# Patient Record
Sex: MALE | Race: OTHER | Employment: FULL TIME | URBAN - NONMETROPOLITAN AREA
[De-identification: names, ages, dates, MRNs, and addresses within clinical notes are randomized per-mention and may not be internally consistent; named-entity substitution may affect disease eponyms.]

---

## 2021-02-22 ENCOUNTER — HOSPITAL ENCOUNTER (OUTPATIENT)
Dept: PHYSICAL THERAPY | Age: 36
Setting detail: THERAPIES SERIES
Discharge: HOME | End: 2021-02-22
Payer: COMMERCIAL

## 2021-02-22 PROCEDURE — 97161 PT EVAL LOW COMPLEX 20 MIN: CPT

## 2021-02-22 NOTE — FLOWSHEET NOTE
** PLEASE SIGN, DATE AND TIME CERTIFICATION BELOW AND RETURN TO [facility] OUTPATIENT REHABILITATION (FAX #: [phone]). ATTEST/CO-SIGN IF ACCESSING VIA [employer]. THANK YOU.**    I certify that I have examined the patient below and determined that Physical Medicine and Rehabilitation service is necessary and that I approve the established plan of care for up to 90 days or as specifically noted. Attestation, signature or co-signature of physician indicates approval of certification requirements.    ________________________ ____________ __________  Physician Signature   Date   Time  [address] [facility]  PHYSICAL THERAPY  [x] EVALUATION  [] DAILY NOTE (LAND) [] DAILY NOTE (AQUATIC ) [] PROGRESS NOTE [] DISCHARGE NOTE    [x] [address] [facility]   [] [place] [address]    [] [facility] [facility]   [] Armand Leosffer    Date: 2021  Patient Name:  Endy Mcgill  : 1985  MRN: [id number]  CSN: [id number]    Referring Practitioner Lucia Adam PA-C   Diagnosis Sprain of medial collateral ligament of right knee, subsequent encounter [S83.411D]    Treatment Diagnosis S83.411D   Date of Evaluation 21    Additional Pertinent History Anxiety Disorder/PTSD      Functional Outcome Measure Used LEFS   Functional Outcome Score 43 (21)       Insurance: Primary: Payor: [address] [place] [address] Road /  /  / ,   Secondary:    Authorization Information: Approved for 2-3 times a week for 4 weeks, total of 12 visits from 21 to 21. An extension has been asked for. Aquatics is not covered. Modalities are covered. Telehealth is not covered. 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   He was standing in mud, when he went to move he picked up right foot and it slid in mud. He heard a pop and had pain when he was trying to push out of the mud. At lunch time, pain decreased. That afternoon he was unable to kneel on right knee. Went to ER that night ([facility]). He went to Occupational Health the next day. Had an MRI completed the end of January. MRI showing sprain of MCL and patellar tendonitis on right knee. Social/Functional History and Current Status:  Medications and Allergies have been reviewed and are listed on Medical History Questionnaire. Alonzo Caldwell lives with spouse in a multiple floor home with ability to complete ADL's on main floor with stairs and a handrail to enter. Task Previous Current   ADLs  Independent Independent   IADL's Independent Independent   Ambulation Independent Independent   Transfers Independent Independent   Recreation Independent Independent   Community Integration Independent Independent   Driving Active  Active    Work [employer]. Occupation: 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  Lifting, kneeling, awkward positions  [employer]. OBJECTIVE:    Pain: 5/10 right knee   Palpation Good patellar mobility bilateral knee   Edema Left knee: 51.5cm, Right: 49.5cm. Painful inferior right knee. Range of Motion Right knee: 0-121 degrees, Left knee: 0-125 degrees. Strength    Coordination No difficulties   Sensation No numbness or tingling   Bed Mobility Independent   Transfers Mod I   Ambulation Ambulation with no AD.   Slight decreased pace, decreased heel strike       Balance SLS 15 seconds bilateral             TREATMENT   Precautions: Anxiety Disorder/PTSD   Pain: 5/10 right knee    X in shaded column indicates activity completed today   Modalities Parameters/  Location  Notes                     Manual Therapy Time/Technique  Notes                     Exercise/Intervention   Notes Specific Interventions Next Treatment: Modalities as needed for pain, right knee stretches and strengthening    Activity/Treatment Tolerance:  [x]  Patient tolerated treatment well  []  Patient limited by fatigue  []  Patient limited by pain   []  Patient limited by medical complications  []  Other:     Assessment: Bar Garcia presents to therapy with right knee pain. He will benefit from therapy to assist with decreasing pain, increasing ROM and strength. Body Structures/Functions/Activity Limitations: impaired activity tolerance, impaired balance, impaired ROM, impaired sensation, impaired strength, pain and abnormal gait  Prognosis: good    GOALS:  Patient Goal: \"Get back to regular everyday activities without any pain. \"    Short Term Goals:  Time Frame: 4 weeks  1. Improve LEFS to 48 or greater to assist with duties at work. 2.  Increase right knee ROM to 0-125 to assist with bringing leg into car. 3.  Decrease right knee pain to 3/10 to assist with return to full duty at work. 4.  Increase right knee strength to 5/5 to assist with getting up from a lower car. Long Term Goals:  Time Frame: NA      Patient Education:    [x]  HEP/Education Completed: Plan of Care, Goals   [address] Access Code:  []  No new Education completed  []  Reviewed Prior HEP      []  Patient verbalized and/or demonstrated understanding of education provided. []  Patient unable to verbalize and/or demonstrate understanding of education provided. Will continue education. [x]  Barriers to learning: none    PLAN:  Treatment Recommendations: Strengthening, Range of Motion, Functional Mobility Training, Transfer Training, Endurance Training, Gait Training, Manual Therapy - Soft Tissue Mobilization, Pain Management, Home Exercise Program, Patient Education, Safety Education and Training, Positioning and Modalities    [x]  Plan of care initiated.   Plan to see patient 2-3 times per week for 4 weeks to address the treatment planned outlined above.   []  Continue with current plan of care  []  Modify plan of care as follows:    []  Hold pending physician visit  []  Discharge    Time In 1645   Time Out 1730   Timed Code Minutes: 0 min   Total Treatment Time: 45 min       Electronically Signed by: Antoinette Perez

## 2021-02-25 ENCOUNTER — HOSPITAL ENCOUNTER (OUTPATIENT)
Dept: PHYSICAL THERAPY | Age: 36
Setting detail: THERAPIES SERIES
Discharge: HOME | End: 2021-02-25
Payer: COMMERCIAL

## 2021-02-25 PROCEDURE — 97110 THERAPEUTIC EXERCISES: CPT

## 2021-02-25 NOTE — PROGRESS NOTES
[address] [facility]  PHYSICAL THERAPY  [] EVALUATION  [x] DAILY NOTE (LAND) [] DAILY NOTE (AQUATIC ) [] PROGRESS NOTE [] DISCHARGE NOTE    [x] OUTPATIENT REHABILITATION [facility]   [] [place] [address]    [] [facility]   [] Delmis Conway    Date: 2021  Patient Name:  Debbie Spencer  : 1985  MRN: [id number]  CSN: [id number]    Referring Practitioner Samantha Du PA-C   Diagnosis Sprain of medial collateral ligament of right knee, subsequent encounter [S83.411D]    Treatment Diagnosis S83.411D   Date of Evaluation 21    Additional Pertinent History Anxiety Disorder/PTSD      Functional Outcome Measure Used LEFS   Functional Outcome Score 43 (21)       Insurance: Primary: Payor: [address] [place] [address] /  /  / ,   Secondary:    Authorization Information: Approved for 2-3 times a week for 4 weeks, total of 12 visits from 21 to 21. An extension received to 21. 12 visits from 21 to 21. Aquatics is not covered. Modalities are covered. Telehealth is not covered. Visit # 2, 2/10 for progress note   Visits Allowed: 12   Recertification Date:    Physician Follow-Up: 21   Physician Orders: Bryan Arriaga and treat   History of Present Illness: Right knee pain since 2020     SUBJECTIVE: Patient reports that he has increased right knee stiffness after work. He denies pain today.      TREATMENT   Precautions: Anxiety Disorder/PTSD   Pain: Right knee stiffness at end of work day, denies pain    X in shaded column indicates activity completed today   Modalities Parameters/  Location  Notes                     Manual Therapy Time/Technique  Notes                     Exercise/Intervention   Notes   Sports Art bike  x5 minutes  Level 3  X Seat 5    Step stretches: knee flexion, knee extension, calf stretch  3 sets ea 20 sec X RLE          Supine:        Quad sets, SAQ,  10x 5 sec X RLE   Heel sides, hip abduction 10x  X RLE   Side lying: hip abduction, clamshells  10x  X RLE                 Standing:        Heel/toe raises, HS curls, 3 way hip kicks, marches, mini squats 10x B  X    Rockerboard: taps/balance 10x taps  20 sec balance front/back  X No UE with balance   NK table (knee flexion, extension) with ball squeeze  5-10x ea 2.5# X Had complaints of \"popping\" at right medial knee region but denied it being painful but limited reps today   TG squats 10x Level J X Cues to decrease range, and pain noting more tolerable     Specific Interventions Next Treatment: Modalities as needed for pain, right knee stretches and strengthening    Activity/Treatment Tolerance:  [x]  Patient tolerated treatment well  []  Patient limited by fatigue  []  Patient limited by pain   []  Patient limited by medical complications  []  Other:     Assessment: Initiated therapeutic exercises as documented above. Demonstration and cues provided on proper technique with added exercises to ensure maximal muscle activation. Patient tolerated treatment session well. He noted some \"popping\" at his right medial knee region while performing NK table exercises, so limited reps. GOALS:  Patient Goal: \"Get back to regular everyday activities without any pain. \"    Short Term Goals:  Time Frame: 4 weeks  1. Improve LEFS to 48 or greater to assist with duties at work. 2.  Increase right knee ROM to 0-125 to assist with bringing leg into car. 3.  Decrease right knee pain to 3/10 to assist with return to full duty at work. 4.  Increase right knee strength to 5/5 to assist with getting up from a lower car. Long Term Goals:  Time Frame: NA    educated   Patient Education:   [x]  HEP/Education Completed: Initiated therapeutic exercises.  [employer] Access Code:  []  No new Education completed  []  Reviewed Prior HEP      [x]  Patient verbalized and/or demonstrated understanding of education provided.   []  Patient unable to verbalize and/or demonstrate understanding of education provided. Will continue education. [x]  Barriers to learning: none    PLAN:  Treatment Recommendations: Strengthening, Range of Motion, Functional Mobility Training, Transfer Training, Endurance Training, Gait Training, Manual Therapy - Soft Tissue Mobilization, Pain Management, Home Exercise Program, Patient Education, Safety Education and Training, Positioning and Modalities    []  Plan of care initiated. Plan to see patient 2-3 times per week for 4 weeks to address the treatment planned outlined above.   [x]  Continue with current plan of care  []  Modify plan of care as follows:    []  Hold pending physician visit  []  Discharge    Time In 1546   Time Out 1625   Timed Code Minutes: 39 min   Total Treatment Time:  39 min       Electronically Signed by: Ame Brennan

## 2021-03-03 NOTE — DISCHARGE SUMMARY
[address] [facility]    Patient Name: Kishore Stanton        CSN: [id number]   YOB: 1985  Gender: male  Torie Kum[place], [place],    Sprain of medial collateral ligament of right knee, subsequent encounter [O25.783C] ,      Patient is discharged from Physical Therapy services at this time. See last note for details related to results of therapy and goal achievement. Reason for discharge: Minajaspreet Bentley attended evaluation and 1 treatment. He then no-showed for appointments on 3/1 and 3/2. He was called and reports he is back to full duty at work and to cancel his appointments. Please see previous notes for further details. Hussain Escobedo  [address], [address] [facility]

## 2021-03-26 ENCOUNTER — HOSPITAL ENCOUNTER (OUTPATIENT)
Dept: PHYSICAL THERAPY | Age: 36
Setting detail: THERAPIES SERIES
Discharge: HOME | End: 2021-03-26
Payer: COMMERCIAL

## 2022-01-18 ENCOUNTER — INITIAL CONSULT (OUTPATIENT)
Dept: PULMONOLOGY | Age: 37
End: 2022-01-18
Payer: OTHER GOVERNMENT

## 2022-01-18 VITALS
TEMPERATURE: 98.4 F | OXYGEN SATURATION: 98 % | DIASTOLIC BLOOD PRESSURE: 86 MMHG | BODY MASS INDEX: 41.3 KG/M2 | WEIGHT: 295 LBS | HEIGHT: 71 IN | SYSTOLIC BLOOD PRESSURE: 128 MMHG | HEART RATE: 76 BPM

## 2022-01-18 DIAGNOSIS — G47.10 HYPERSOMNIA: ICD-10-CM

## 2022-01-18 DIAGNOSIS — I10 BENIGN ESSENTIAL HTN: ICD-10-CM

## 2022-01-18 DIAGNOSIS — R06.89 SLEEP RELATED CHOKING SENSATION: ICD-10-CM

## 2022-01-18 DIAGNOSIS — E66.01 MORBID OBESITY WITH BMI OF 40.0-44.9, ADULT (HCC): ICD-10-CM

## 2022-01-18 DIAGNOSIS — R06.81 WITNESSED EPISODE OF APNEA: ICD-10-CM

## 2022-01-18 DIAGNOSIS — G47.33 OSA (OBSTRUCTIVE SLEEP APNEA): Primary | ICD-10-CM

## 2022-01-18 PROCEDURE — 99203 OFFICE O/P NEW LOW 30 MIN: CPT | Performed by: INTERNAL MEDICINE

## 2022-01-18 RX ORDER — MELATONIN 10 MG
10 CAPSULE ORAL NIGHTLY
COMMUNITY

## 2022-01-18 RX ORDER — MELOXICAM 15 MG/1
15 TABLET ORAL DAILY
COMMUNITY

## 2022-01-18 RX ORDER — AMLODIPINE BESYLATE 5 MG/1
5 TABLET ORAL DAILY
COMMUNITY

## 2022-01-18 NOTE — PATIENT INSTRUCTIONS
Patient Education        Learning About CPAP for Sleep Apnea  What is CPAP? CPAP is a small machine that you use at home every night while you sleep. It increases air pressure in your throat to keep your airway open. When you have sleep apnea, this can help you sleep better, feel better, and avoid future health problems. CPAP stands for \"continuous positive airway pressure. \"  The CPAP machine will have one of the following:  · A mask that covers your nose and mouth  · A mask that covers your nose only  · A nasal pillow that covers only the openings of your nose  Why is it done? CPAP is usually the best treatment for obstructive sleep apnea. It is the first treatment choice and the most widely used. CPAP:  · Helps you have more normal sleep, so you feel less sleepy and more alert during the daytime. · May help keep heart failure or other heart problems from getting worse. · May help lower your blood pressure. If you have a bed partner, they may also sleep better when you use a CPAP. That's because you aren't snoring or restless. Your doctor may suggest CPAP if you have:  · Moderate to severe sleep apnea. · Sleep apnea and coronary artery disease (CAD). · Sleep apnea and heart failure. What are the side effects? Some people who use CPAP have:  · A dry or stuffy nose and a sore throat. · Irritated skin on the face. · Bloating. How can you care for yourself? If using CPAP is not comfortable, or if you have certain side effects, work with your doctor to fix them. Here are some things you can try:  · Be sure the mask, nasal mask, or nasal pillow fits well. · See if your doctor can adjust the pressure of your CPAP. · If your nose or mouth is dry, set the machine to deliver warmer or wetter air. Or try using a humidifier. · If your nose is runny or stuffy, talk to your doctor about using a decongestant medicine or steroid nasal spray. Be safe with medicines.  Read and follow all instructions on the label. Do not use the medicine longer than the label says. · Your doctor may also help you with problems like swallowing air, bloating, or claustrophobia. Talk to your doctor if you're still having problems. If these things don't help, you might try a different type of machine. Where can you learn more? Go to https://chpeiwonaeweb.OpenTrust. org and sign in to your [employer] account. Enter E758 in the TellMi box to learn more about \"Learning About CPAP for Sleep Apnea. \"     If you do not have an account, please click on the \"Sign Up Now\" link. Current as of: July 6, 2021               Content Version: 13.1  © 2006-2021 Healthwise, R&L. Care instructions adapted under license by [facility]. If you have questions about a medical condition or this instruction, always ask your healthcare professional. [place] [address] any warranty or liability for your use of this information. Patient Education        Learning About Obesity  What is obesity? Obesity means having an unhealthy amount of body fat. This puts your health in danger. It can lead to other health problems, such as type 2 diabetes and high blood pressure. How do you know if your weight is in the obesity range? To know if your weight is in the obesity range, your doctor looks at your body mass index (BMI) and waist size. BMI is a number that is calculated from your weight and your height. To figure out your BMI for yourself, you can use an online tool, such as http://www.varghese.com/ on the [employer]. If your BMI is 30.0 or higher, it falls within the obesity range. Keep in mind that BMI and waist size are only guides. They are not tools to determine your ideal body weight. What causes obesity? When you take in more calories than you burn off, you gain weight.  How you eat, how active you are, and other things affect how your body uses calories and whether you gain weight. If you have family members who have too much body fat, you may have inherited a tendency to gain weight. And your family also helps form your eating and lifestyle habits, which can lead to obesity. Also, our busy lives make it harder to plan and cook healthy meals. For many of us, it's easier to reach for prepared foods, go out to eat, or go to the drive-through. But these foods are often high in saturated fat and calories. Portions are often too large. What can you do to reach a healthy weight? Focus on health, not diets. Diets are hard to stay on and don't work in the long run. It is very hard to stay with a diet that includes lots of big changes in your eating habits. Instead of a diet, focus on lifestyle changes that will improve your health and achieve the right balance of energy and calories. To lose weight, you need to burn more calories than you take in. You can do it by eating healthy foods in reasonable amounts and becoming more active, even a little bit every day. Making small changes over time can add up to a lot. Make a plan for change. Many people have found that naming their reasons for change and staying focused on their plan can make a big difference. Work with your doctor to create a plan that is right for you. · Ask yourself: Vazquez Baumgarten are my personal, most powerful reasons for wanting this change? What will my life look like when I've made the change? \"  · Set your long-term goal. Make it specific, such as \"I will lose x pounds. \"  · Break your long-term goal into smaller, short-term goals. Make these small steps specific and within your reach, things you know you can do. These steps are what keep you going from day to day. Talk with your doctor about other weight-loss options. If you have a BMI in a certain range and have not been able to lose weight with diet and exercise, medicine or surgery may be an option for you.  Before your doctor will prescribe medicines or surgery, he or she will probably want you to be more active and follow your healthy eating plan for a period of time. These habits are key lifelong changes for managing your weight, with or without other medical treatment. And these changes can help you avoid weight-related health problems. How can you stay on your plan for change? Be ready. Choose to start during a time when there are few events like holidays, social events, and high-stress periods. These events might trigger slip-ups. Decide on your first few steps. Most people have more success when they make small changes, one step at a time. For example, you might switch a daily candy bar to a piece of fruit, walk 10 minutes more, or add more vegetables to a meal.  Line up your support people. Make sure you're not going to be alone as you make this change. Connect with people who understand how important it is to you. Ask family members and friends for help in keeping with your plan. And think about who could make it harder for you, and how to handle them. Try tracking. People who keep track of what they eat, feel, and do are better at losing weight. Try writing down things like:  · What and how much you eat. · How you feel before and after each meal.  · Details about each meal (like eating out or at home, eating alone, or with friends or family). · What you do to be active. Look and plan. As you track, look for patterns that you may want to change. Take note of:  · When you eat and whether you skip meals. · How often you eat out. · How many fruits and vegetables you eat. · When you eat beyond feeling full. · When and why you eat for reasons other than being hungry. When you stray from your plan, don't get upset. Figure out what made you slip up and how you can fix it. Can you take medicines or have surgery to lose weight?   If you have a BMI in a certain range and have not been able to lose weight with diet and exercise, medicine or surgery may be an option for you. If you have a BMI of at least 30.0 (or a BMI of at least 27.0 and another health problem related to your weight), ask your doctor about weight-loss medicines. They work by making you feel less hungry, making you feel full more quickly, or changing how you digest fat. Medicines are used along with diet changes and more physical activity to help you make lasting changes. If you have a BMI of 40.0 or more (or a BMI of 35.0 or more and another health problem related to your weight), your doctor may talk with you about surgery. Weight-loss surgery has risks, and you will need to work with your doctor to compare the risk of having obesity with the risks of surgery. With any option you choose, you will still need to eat a healthy diet and get regular exercise. Follow-up care is a key part of your treatment and safety. Be sure to make and go to all appointments, and call your doctor if you are having problems. It's also a good idea to know your test results and keep a list of the medicines you take. Where can you learn more? Go to https://BandwidthpepicewGMI Ratings.Evryx Technologies. org and sign in to your Add[employer] account. Enter N111 in the [facility] box to learn more about \"Learning About Obesity. \"     If you do not have an account, please click on the \"Sign Up Now\" link. Current as of: March 17, 2021               Content Version: 13.1  © 4082-7217 Healthwise, Incorporated. Care instructions adapted under license by [facility] ([facility]). If you have questions about a medical condition or this instruction, always ask your healthcare professional. [place] [address] any warranty or liability for your use of this information. Patient Education        Learning About Low-Carbohydrate Foods  What foods are low in carbohydrate? The foods you eat contain nutrients, such as vitamins and minerals. Carbohydrate is a nutrient.  Your body needs the right amount to stay healthy and work as it should. You can use the list below to help you make choices about which foods to eat. Some foods that are lower in carbohydrate include:  Dairy and dairy alternatives  · Cheese  · Cottage cheese  · Cream cheese  · Nut milk (unsweetened)  · Soy milk (unsweetened)  · Yogurt (Greek, plain)  Fruits  · Avocado  · [employer] and other protein foods  · Almonds  · Beef  · Chicken  · Cod  · Eggs  · Halibut  · Peanut butter and other nut butters  · Pistachios  · Pork  · Pumpkin seeds  · Tofu  · Trout  · [place]  · [place] ([facility])  · Walnuts  Vegetables  · Broccoli  · Carrots  · Cauliflower  · Green beans  · Mushrooms  · Peppers  · Salad greens  · Spinach  · Tomatoes  Work with your doctor to find out how much of this nutrient you need. Depending on your health, you may need more or less of it in your diet. Where can you learn more? Go to https://Boomsense.e Health Access. org and sign in to your [employer] account. Enter [id number] in the [employer] box to learn more about \"Learning About Low-Carbohydrate Foods. \"     If you do not have an account, please click on the \"Sign Up Now\" link. Current as of: September 8, 2021               Content Version: 13.1  © 9915-2338 Healthwise, Incorporated. Care instructions adapted under license by [facility] ([facility]). If you have questions about a medical condition or this instruction, always ask your healthcare professional. [place] [address] any warranty or liability for your use of this information. Patient Education        Learning About Low-Carbohydrate Diets  What is a low-carbohydrate diet? A low-carbohydrate (or \"low-carb\") diet limits foods and drinks that have carbohydrates. This includes grains, fruits, milk and yogurt, and starchy vegetables like potatoes, beans, and corn. It also avoids foods and drinks that have added sugar. Instead, low-carb diets include foods that are high in protein and fat.   Why might you follow a low-carb diet? Low-carb diets may be used for a variety of reasons, such as for weight loss. People who have diabetes may use a low-carb diet to help manage their blood sugar levels. What should you do before you start the diet? Talk to your doctor before you try any diet. This is even more important if you have health problems like kidney disease, heart disease, or diabetes. Your doctor may suggest that you meet with a registered dietitian. A dietitian can help you make an eating plan that works for you. What foods do you eat on a low-carb diet? On a low-carb diet, you choose foods that are high in protein and fat. Examples of these are:  · Meat, poultry, and fish. · Eggs. · Nuts, such as walnuts, pecans, almonds, and peanuts. · Peanut butter and other nut butters. · Tofu. · Avocado. · Reatha Hazy. · Non-starchy vegetables like broccoli, cauliflower, green beans, mushrooms, peppers, lettuce, and spinach. · Unsweetened non-dairy milks like almond milk and coconut milk. · Cheese, cottage cheese, and cream cheese. Current as of: September 8, 2021               Content Version: 13.1  © 2006-2021 Healthwise, Incorporated. Care instructions adapted under license by [facility] ([facility]). If you have questions about a medical condition or this instruction, always ask your healthcare professional. [place] [address] any warranty or liability for your use of this information.

## 2022-01-18 NOTE — PROGRESS NOTES
New Sleep Patient H/P    Presentation:  Last Watters is referred by [place] for RUBÉN    Last Watters is a text book case of RUBÉN, was investigated at the [place] via HST revealing severe RUBÉN with AHI of 55/h. Marlon, snores very loudly, stops breathing at night according to his wife, wakes up choking and regurgitating food, wakes up frequently, sleep is non restorative, sleepy during the day. Has gained a lot of wt (80 lbs), BMI 41. Time in Bed:   Bedtime: 10 p.m. Awakens  6 a.m. Different on weekends? No      Marlon falls asleep in 10  minutes. Any awakenings? Yes  Difficulty Falling back to sleep? No  {Symptoms began:  a few years ago. Symptoms include: snoring, choking, gasping, periods of not breathing, excessive daytime sleepiness, disrupted sleep, naps    Previous evaluation and treatment? Yes    Which ones? H[facility]    He denies any history of sleep walking or sleep talking. No history of seizures activity. No history suggestive of restless legs syndrome. No history of bruxism. No history of head injury. Naps:  Any naps? Yes and are they helpful Yes    Snoring and Apneas:  Do you snore or been told you a snore? Yes  How long have known about your snoring? years  Any witnessed apneas? Yes  Any awakenings with choking or gasping? Yes    Dreams:  Any recurring dreams? No  Hallucinations? No  Sleep Paralysis? No  Symptoms of Cataplexy? No    Driving History:  Do you have a CDL or drive long distances for work? No  Any driving accidents in the past year? No  Any sleepiness while driving? Yes    Weight:  Any change in weight over the past year? Yes   How about past 5 years? Yes  How much? 80      No past medical history on file.     Past Surgical History:   Procedure Laterality Date    APPENDECTOMY      ROTATOR CUFF REPAIR      VASECTOMY      WISDOM TOOTH EXTRACTION         Social History     Tobacco Use    Smoking status: Never Smoker    Smokeless tobacco: Not on file   Substance Use Topics    Alcohol use: No    Drug use: No       Allergies   Allergen Reactions    Morphine        Current Outpatient Medications   Medication Sig Dispense Refill    famotidine (PEPCID) 20 MG tablet Take 1 tablet by mouth 2 times daily as needed 60 tablet 0    naproxen (NAPROSYN) 500 MG tablet Take 1 tablet by mouth 2 times daily. 60 tablet 0     No current facility-administered medications for this visit. No family history on file. Any family history of any sleep problems or any one in your family on CPAP? No    Social History     Tobacco Use    Smoking status: Never Smoker    Smokeless tobacco: Not on file   Substance Use Topics    Alcohol use: No    Drug use: No     Caffeine Intake: How much soda (pop), coffee, tea, power drinks do you ingest per day? 0 per day. Employment History:  Where do you work? [employer]  What are your shifts? 7a to 3p      Review of Systems:   General/Constitutional: No recent loss of weight or appetite changes. No fever or chills. HENT: Negative. Eyes: Negative. Upper respiratory tract: No nasal stuffiness or post nasal drip. Lower respiratory tract/ lungs: No cough or sputum production. No hemoptysis. Cardiovascular: No palpitations or chest pain. Gastrointestinal: No nausea or vomiting. Neurological: No focal neurologiacal weakness. Extremities: No edema. Musculoskeletal: No complaints. Genitourinary: No complaints. Hematological: Negative. Psychiatric/Behavioral: Negative. Skin: No itching.   Physical Exam:    HEIGHTHeight: 5' 11\" (180.3 cm) WEIGHTWeight: 295 lb (133.8 kg)    BMI:  41   Neck Size: 19.25     SAQLI:26   ESS: 21   Vitals:   Vitals:    01/18/22 1328   BP: 128/86   Site: Left Lower Arm   Position: Sitting   Cuff Size: Medium Adult   Pulse: 76   Temp: 98.4 °F (36.9 °C)   TempSrc: Temporal   SpO2: 98%  Comment: r/a   Weight: 295 lb (133.8 kg)   Height: 5' 11\" (1.803 m)          Mallampati Score: 4    Physical Exam :  Constitutional: BMI 41  HENT:   Head: Normocephalic and atraumatic. Mouth/Throat: Large tongue, crowded pharynx, mallampati class 3   Eyes: Conjunctivae are normal. PERRLA. No scleral icterus. Neck: Neck supple. No JVD present. No tracheal deviation present. 19.2 IN circumference  Cardiovascular: Normal rate, regular rhythm, normal heart sounds. No murmur heard. Pulmonary/Chest: Effort normal and breath sounds normal. No stridor. No respiratory distress. No wheezes. No rales. Abdominal: Soft. No distension. No tenderness. Musculoskeletal: Normal range of motion. Lymphadenopathy:  No cervical adenopathy. Neurological: Alert and oriented to person, place, and time. No focal deficits. Skin: Skin is warm and dry. Patient is not diaphoretic. Psychiatric: Normal behavior with normal mood and affect. Diagnostic Data:    PAP Download:   Original or initial  AHI: 55.1     Date of initial study: 11/4/21         Assessment    Diagnosis Orders   1. RUBÉN (obstructive sleep apnea)  DME Order for CPAP as OP   2. Hypersomnia  DME Order for CPAP as OP   3. Morbid obesity with BMI of 40.0-44.9, adult ([facility] [address]  DME Order for CPAP as OP   4. Sleep related choking sensation  DME Order for CPAP as OP   5. Witnessed episode of apnea  DME Order for CPAP as OP       Plan     Orders Placed This Encounter   Procedures    DME Order for CPAP as OP     CPAP Auto Adjust 10 - 20 cm H2O    Heated Humidifier    Heated Tubing with Integrated Element 1 per 3 months    Full Face Mask 1 per 3 months and Cushions/Seals 1 per month    Headgear 1 per 6 months, Chin Strap 1 per 6 months, Disposable Filters 2 per month, Non-disposable Filters 1 per 6 months, Chambers 1 per 6 months and Other Related Supplies. Replace supplies and accessories as needed. Patient may choose another interface for compliance and comfort.   Comments: Needs mask fitting, long facial hair  Diagnosis: G47.33  Length of need: 12 Months          Mask Desensitization and Pre study teaching? No  Weight Loss Information Given? Yes  Sleep Hygiene Discussed? Yes    -He was advised to call [facility] regarding supplies if needed.  -He call my office for earlier appointment if needed for worsening of sleep symptoms.  -Sherie  educated about my impression and plan. Patient verbalizes understanding.

## 2022-06-27 ENCOUNTER — OFFICE VISIT (OUTPATIENT)
Dept: NEUROSURGERY | Age: 37
End: 2022-06-27
Payer: OTHER GOVERNMENT

## 2022-06-27 ENCOUNTER — HOSPITAL ENCOUNTER (OUTPATIENT)
Dept: MRI IMAGING | Age: 37
Discharge: HOME | End: 2022-06-27

## 2022-06-27 VITALS
DIASTOLIC BLOOD PRESSURE: 90 MMHG | WEIGHT: 315 LBS | HEIGHT: 71 IN | BODY MASS INDEX: 44.1 KG/M2 | HEART RATE: 81 BPM | SYSTOLIC BLOOD PRESSURE: 146 MMHG

## 2022-06-27 DIAGNOSIS — Z00.6 EXAMINATION FOR NORMAL COMPARISON FOR CLINICAL RESEARCH: ICD-10-CM

## 2022-06-27 DIAGNOSIS — M54.16 LUMBAR RADICULOPATHY: Primary | ICD-10-CM

## 2022-06-27 PROCEDURE — 99203 OFFICE O/P NEW LOW 30 MIN: CPT

## 2022-06-27 RX ORDER — HYDROCHLOROTHIAZIDE 25 MG/1
25 TABLET ORAL DAILY
COMMUNITY
Start: 2022-06-15

## 2022-06-27 ASSESSMENT — ENCOUNTER SYMPTOMS
BACK PAIN: 1
COLOR CHANGE: 0
TROUBLE SWALLOWING: 0
CONSTIPATION: 0
COUGH: 0
SHORTNESS OF BREATH: 0
NAUSEA: 0

## 2022-06-27 NOTE — PROGRESS NOTES
[facility] PROFESSIONAL SERVS  [address] [place] [address]  Dept: [phone]  Dept Fax: [phone]      Patient Name:  Luther Babb  Visit Date:  6/27/2022    HPI:     Mr. Allison Molina is a 39 y.o. male that presents today at [facility] Neurosurgery for evaluation of the following:      Chief Complaint   Patient presents with    Consultation     Low back pain        HPI   Chata Leyva is a 39year old who presents to the office alone ambulating without assistive device. 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 He stated that his pain has progressively gotten worse. 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 He stated his pain is constant. He stated his pain at its worst is a 10 and on average a 8. He stated the pain radiates into his bilateral buttocks and into the posterior thigh bilaterally and and into the right lateral and anterior right thigh. The pain also radiates down his right leg into his right shin. He describes his pain as a sharp,  shooting, and burning sensation. He stated bending, twisting, and lifting makes his pain worse. Standing or sitting for long period of time increases his pain as well. He stated that laying on the floor or sitting on the floor provides him relief. He stated he has numbness and tingling to his right lateral and anterior thigh. He stated heat helps his pain. He stated ice does no help. He stated he had therapy with chiropractor care, acupuncture, and gentle stretches for 12 weeks. He stated that this did not provide him any relief. He stated he seen pain management back in 2009. He stated this was while he was in [place] at [facility]. He stated  he had Cortizone shots times 4. He has never had nerve burning. Patient stated he had a MRI of his lumbar spine and it showed he had a herniated disc. Will obtain report and images from the [facility]. He stated occasionally he has weakness in his right leg. He stated that he is experiencing muscle spasms. He denies bowel or bladder incontinece. He denies saddle anesthesia. He stated the pain is decreasing his activity and making him more fatigued. He stated the pain is waking him at night. He stated the Voltaren gel does not help. He stated that the mobic does take the edge off. He stated that he has taken Flexeril in the past but it caused drowsiness. He denies recent trauma. Patient denies previous cervical, thoracic, or lumbar surgery. Patient denies cevical, thoracic, and lumbar surgery. Medications:    Current Outpatient Medications:     hydroCHLOROthiazide (HYDRODIURIL) 25 MG tablet, Take 25 mg by mouth daily, Disp: , Rfl:     diclofenac sodium (VOLTAREN) 1 % GEL, Apply topically 3 times daily, Disp: , Rfl:     sertraline (ZOLOFT) 50 MG tablet, Take 50 mg by mouth daily, Disp: , Rfl:     meloxicam (MOBIC) 15 MG tablet, Take 15 mg by mouth daily, Disp: , Rfl:     amLODIPine (NORVASC) 5 MG tablet, Take 5 mg by mouth daily, Disp: , Rfl:     melatonin 10 MG CAPS capsule, Take 10 mg by mouth nightly, Disp: , Rfl:     naproxen (NAPROSYN) 500 MG tablet, Take 1 tablet by mouth 2 times daily. , Disp: 60 tablet, Rfl: 0    The patient is allergic to morphine and wound dressings. Past Medical History  Jeremy Robbins  has a past medical history of Anxiety, Hypertension, Low back pain, and Sleep apnea. Past Surgical History  The patient  has a past surgical history that includes Appendectomy; Vasectomy; [place] tooth extraction; and Rotator cuff repair. Family History  This patient's family history includes No Known Problems in his mother. Social History  Jeremy Robbins  reports that he quit smoking about 13 years ago. He started smoking about 19 years ago. He has a 6.00 pack-year smoking history. He has never used smokeless tobacco. He reports previous alcohol use.  He reports that he does not use drugs.    Subjective:      Review of Systems   Constitutional: Positive for activity change and fatigue. Negative for appetite change. HENT: Negative for trouble swallowing. Eyes: Negative for visual disturbance. Respiratory: Negative for cough and shortness of breath. Cardiovascular: Negative for chest pain. Gastrointestinal: Negative for constipation and nausea. Genitourinary: Negative for difficulty urinating. Musculoskeletal: Positive for back pain, gait problem and myalgias. Skin: Negative for color change, rash and wound. Neurological: Positive for numbness. Negative for dizziness and weakness. Psychiatric/Behavioral: Positive for sleep disturbance. Negative for confusion. The patient is not nervous/anxious. Objective:     BP (!) 146/90 (Site: Right Upper Arm, Position: Sitting, Cuff Size: Large Adult)   Pulse 81   Ht 5' 10.98\" (1.803 m)   Wt (!) 315 lb 12.8 oz (143.2 kg)   BMI 44.07 kg/m²        Physical Exam  Musculoskeletal:         General: Tenderness present. Lumbar back: Spasms and tenderness present. Positive right straight leg raise test. Negative left straight leg raise test.        Back:       Comments: Positive vilma's on the right         Reviewed MRI Type:Lumbar spine  Report    Lab Results   Component Value Date    WBC 6.0 08/20/2015    HGB 14.4 08/20/2015    HCT 42.5 08/20/2015     08/20/2015    ALT 62 11/27/2011    AST 34 11/27/2011     08/20/2015    K 3.7 08/20/2015     08/20/2015    CREATININE 1.0 08/20/2015    BUN 15 08/20/2015    CO2 21 (L) 08/20/2015       Assessment and Plan      Diagnosis Orders   1.  Lumbar radiculopathy         -New patient referral for lumbar pain   -MRI report reviewed with patient   -Will have office staff obtain MRI of the lumbar spine  image and report from the [facility]   -CT of the lumbar spine without contrast to rule out bony abnormalities that could be leading to symptom presenation  -Referral to pain management   - Discussed with patient weight loss and healthy food options- patient has seen a dietician previously   - Continue gentle stretches at home at  Least 3 times a week. - If you experience sudden weakness, bowel, or bladder  incontinence report to the emergency room to be evaluated  -Follow up in 4 weeks  -All patient questions answered. Pt voiced understanding.  Patient instructed to call the office with any questions or concerns    Electronically signed by LUIS ANTONIO Roldan CNP on 6/27/2022 at 3:32 PM

## 2022-07-08 ENCOUNTER — HOSPITAL ENCOUNTER (OUTPATIENT)
Dept: CT IMAGING | Age: 37
Discharge: HOME | End: 2022-07-08
Payer: OTHER GOVERNMENT

## 2022-07-08 DIAGNOSIS — M54.16 LUMBAR RADICULOPATHY: ICD-10-CM

## 2022-07-08 PROCEDURE — 72131 CT LUMBAR SPINE W/O DYE: CPT

## 2022-07-29 ENCOUNTER — TELEPHONE (OUTPATIENT)
Dept: NEUROSURGERY | Age: 37
End: 2022-07-29

## 2022-07-29 NOTE — TELEPHONE ENCOUNTER
Called spoke to patient, he advised that he was seeing pain management at the [address] [place] [address]. I verbalized understanding and if he could obtain the visit notes.

## 2022-07-29 NOTE — TELEPHONE ENCOUNTER
Patient is returning call from the office about possibly needing to r/s his appt on 08/01/2022. The message on the appt said he was needing to see pain management prior to seeing neurosurgery. Patient is stating that he has been seeing pain management through the [address] will not cover him to be seen at the pain management office here d/t this. Patient stated he will contact the [place] about this as well.   Please advise

## 2022-10-17 ENCOUNTER — OFFICE VISIT (OUTPATIENT)
Dept: PHYSICAL MEDICINE AND REHAB | Age: 37
End: 2022-10-17
Payer: OTHER GOVERNMENT

## 2022-10-17 VITALS
BODY MASS INDEX: 44.1 KG/M2 | SYSTOLIC BLOOD PRESSURE: 160 MMHG | DIASTOLIC BLOOD PRESSURE: 90 MMHG | HEIGHT: 71 IN | WEIGHT: 315 LBS

## 2022-10-17 DIAGNOSIS — G89.29 OTHER CHRONIC PAIN: ICD-10-CM

## 2022-10-17 DIAGNOSIS — M51.36 DEGENERATIVE DISC DISEASE, LUMBAR: ICD-10-CM

## 2022-10-17 DIAGNOSIS — M79.18 MYOFASCIAL PAIN: ICD-10-CM

## 2022-10-17 DIAGNOSIS — M47.816 LUMBAR SPONDYLOSIS: Primary | ICD-10-CM

## 2022-10-17 PROCEDURE — 99204 OFFICE O/P NEW MOD 45 MIN: CPT | Performed by: ANESTHESIOLOGY

## 2022-10-17 NOTE — PROGRESS NOTES
Chronic Pain/PM&R Clinic Note     Encounter Date: 10/17/22    Subjective:   Chief Complaint:   Chief Complaint   Patient presents with    New Patient     Lower back. History of Present Illness:   Stephen Fuller is a 39 y.o. male seen in the clinic initially on 10/17/22 upon request from LUIS ANTONIO Eng for his history of chronic low back pain. He has been dealing with low back pain since 2004 since he was in the [facility]. He states that he sustained an injury while in the [employer]. His pain has progressively gotten worse over the last 20 years. He states his pain is in the small of his low back at his waistline. He rates this as a constant 6/10 aching, stabbing pain. His pain is made worse with any activities where he is on his feet especially with any twisting turning or bending. His pain is improved with rest and medications. 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 He denies any pain radiating further down his leg, associated leg numbness/tingling, focal leg weakness, saddle anesthesia, bowel/bladder incontinence episodes. He has tried physical therapy in the past, medical acupuncture and feels like these have not been very effective for him in the past.    History of Interventions:   Surgery: No previous lumbar surgeries  Injections: None    Current Treatment Medications:   Meloxicam 15 mg daily    Historical Treatment Medications:   None    Imaging/Studies:  CT L-spine (7/8/22)    PROCEDURE: CT LUMBAR SPINE WO CONTRAST       CLINICAL INFORMATION: Lumbar radiculopathy. Back pain. COMPARISON: No prior study. TECHNIQUE: 3 mm noncontrast axial images were obtained of the lumbar spine. Sagittal and coronal reconstructions were created. Angled images were reconstructed through the L3-4, L4-5 and L5-S1 disc levels.        All CT scans at this facility use dose modulation, iterative reconstruction, and/or weight-based dosing when appropriate to reduce radiation dose to as low as reasonably achievable. FINDINGS:   There is minimal, grade 1, retrolisthesis of L2 relative to L3, L3 relative to L4 and L4 relative to L5 on the basis of degenerative change. There is otherwise anatomic vertebral body height and alignment. No fracture of the lumbar vertebral column is    evident. Paraspinal soft tissues are unremarkable. At T12-L1 there is no significant spinal canal or neuroforaminal stenosis. At L1-2 there is no significant spinal canal or neuroforaminal stenosis. At L2-3 there is a minimal disc bulge without significant spinal canal stenosis and mild bilateral neural foraminal stenosis in association with mild ligament flavum thickening. At L3-4 there is partial uncovering the disc with superimposed shallow disc bulge which causes mild spinal canal stenosis in association with ligamentum flavum thickening. There is mild to moderate bilateral neural foraminal stenosis. At L4-5 there is minimal partial tearing the disc with superimposed shallow disc bulge which causes mild spinal canal stenosis in association with ligamentum flavum thickening. There is mild to moderate bilateral foraminal stenosis. At L5-S1 there is no significant spinal canal or neuroforaminal stenosis. Impression   Overall mild degenerative changes of the lumbar spine with areas of mild spinal canal stenosis and up to mild to moderate neural foraminal stenosis.        Past Medical History:   Diagnosis Date    Anxiety     Hypertension     Low back pain     Sleep apnea        Past Surgical History:   Procedure Laterality Date    APPENDECTOMY      ROTATOR CUFF REPAIR      VASECTOMY      WISDOM TOOTH EXTRACTION         Family History   Problem Relation Age of Onset    No Known Problems Mother          Medications & Allergies:   Current Outpatient Medications   Medication Instructions    amLODIPine (NORVASC) 5 mg, Oral, DAILY diclofenac sodium (VOLTAREN) 1 % GEL Topical, 3 TIMES DAILY    hydroCHLOROthiazide (HYDRODIURIL) 25 mg, Oral, DAILY    melatonin 10 mg, Oral, NIGHTLY    meloxicam (MOBIC) 15 mg, Oral, DAILY    naproxen (NAPROSYN) 500 mg, Oral, 2 TIMES DAILY    sertraline (ZOLOFT) 50 mg, Oral, DAILY       Allergies   Allergen Reactions    Morphine     Wound Dressings Itching, Rash and Swelling       Review of Systems:   Constitutional: negative for weight changes or fevers  Genitourinary: negative for bowel/bladder incontinence   Musculoskeletal: positive for low back pain  Neurological: negative for any leg weakness or numbness/tingling  Behavioral/Psych: negative for anxiety/depression   All other systems reviewed and are negative    Objective:     Vitals:    10/17/22 1512   BP: (!) 160/90     Constitutional: Pleasant, no acute distress   Head: Normocephalic, atraumatic   Eyes: Conjunctivae normal   Neck: Supple, symmetrical   Lungs: Normal respiratory effort, non-labored breathing   Cardiovascular: Limbs warm and well perfused   Abdomen: Non-protruded   Musculoskeletal: Muscle bulk symmetric, no atrophy, no gross deformities   · Lumbar Spine: ROM reduced in extension. Lumbar paraspinals tender to palpation bilaterally. SLR neg bilaterally. MARTY neg bilaterally. Positive facet loading bilaterally. Bilateral SI joints non-tender to palpation. Neurological: Cranial nerves II-XII grossly intact. · Gait - Slightly antalgic gait. Ambulates without assistive device. · Motor: 5/5 muscle strength in bilateral hip flexion, knee flexion, knee extension, ankle dorsiflexion, and ankle plantar flexion   · Sensory: LT sensation intact in lower limbs   · Reflexes: 2+ symmetrical in bilateral achilles, 2+ bilateral patellar, negative ankle clonus, downgoing babinski   Skin: No rashes or lesions present   Psychological: Cooperative, no exaggerated pain behaviors       Assessment:    Diagnosis Orders   1.  Lumbar spondylosis  CHG FLUOR NEEDLE/CATH SPINE/PARASPINAL DX/THER ADDON    [place] INJ DX/THER AGNT PARAVERT FACET JOINT, LUMBAR/SAC, 1ST LEVEL    [place] INJ DX/THER AGNT PARAVERT FACET JOINT, LUMBAR/SAC, 2ND LEVEL      2. Degenerative disc disease, lumbar        3. Other chronic pain        4. Myofascial pain            Amos Luong is a 39 y.o.male presenting to the pain clinic for evaluation of chronic low back pain. His clinical history and physical examination are consistent with lumbar facet mediated pain secondary to lumbar spondylosis and degenerative disc disease. I set him up for diagnostic lumbar medial branch blocks targeting bilateral L4/L5 and L5/S1 facet joints. Plan: The following treatment recommendations and plan were discussed in detail with Aoms Luong. Imaging/Studies/Labs:   I have reviewed patients imaging of CT L-spine and results were discussed with patient today. Analgesics:   Patient is taking meloxicam. Patient is advised to take as prescribed and not take on an empty stomach. Adjuvants:   None; patient wants to proceed with injection therapy only. Interventions: In presence of lumbar axial back pain and with physical exam consistent for facetal pain, the option of medial branch blocks at bilateral L4/L5 and L5/S1  was chosen. The risks and benefits were discussed in detail with the patient. Patient wants to proceed with the injection. Anticoagulation/NPO Recommendations:   Patient will need to hold meloxicam x 4 days prior to the procedure. Patient will need to be NPO x 8 hours prior to the procedure. Plan to start an IV prior to the procedure. Multidisciplinary Pain Management:   In the presence of complex, chronic, and multi-factorial pain, the importance of a multidisciplinary approach to pain management in the patients management regimen was emphasized and discussed in great detail.    PHYSICAL THERAPY: Patient is advised to see a physical therapist for gentle stretching exercises

## 2022-11-09 ENCOUNTER — PREP FOR PROCEDURE (OUTPATIENT)
Dept: PHYSICAL MEDICINE AND REHAB | Age: 37
End: 2022-11-09

## 2022-11-14 NOTE — H&P
Today, patient presents for planned medial branch blocks at bilateral L4/L5 and L5/S1    This note is reflective of the patient's previous visit for evaluation. We will proceed with today's planned procedure. Since patient's last visit for evaluation, there have been no interval changes in medical history. Patient has no new numbness, weakness, or focal neurological deficit since evaluation. Patient has no contraindications to injection (no anticoagulation or recent antibiotic intake for active infections), and has a  present or is able to drive themselves (as discussed and cleared by physician). Allergies to latex, contrast dye, and steroid medications have been confirmed with the patient prior to the procedure. NPO necessity has been assessed and accepted based on procedure complexity. The risks and benefits of the procedure have been explained including but are not limited to infection, bleeding, paralysis, immediate post procedure weakness, and dizziness; the patient acknowledges understanding and desires to proceed with the procedure. Patient has signed consent for same procedure as discussed in previous clinic encounter. All other questions and concerns were addressed at bedside. See procedure note for full details. Post procedure Instructions: The patient was advised not to drive during the day of the procedure and not to engage in any significant decision making (unless otherwise states by physician). The patient was also advised to be cautious with walking/activity for 24 hours following today's visit and asked not to engage in over-exertion (unless otherwise states by physician). After this time, it is ok to resume pre-procedure level of activity. Patient advised to apply ice to site of injection in situations of pain and discomfort. Patient advised to not submerge site of injection during bath or pool activities for approximately 24 hours post-procedure.  Patient attested to understanding post procedure directions / restrictions. All other questions and concerns addressed before patient discharge in ambulatory fashion. Chronic Pain/PM&R Clinic Note     Encounter Date: 10/17/22    Subjective:   Chief Complaint:   No chief complaint on file. History of Present Illness:   Dimple Hyman is a 39 y.o. male seen in the clinic initially on 11/14/22 upon request from LUIS ANTONIO Mitchell for his history of chronic low back pain. He has been dealing with low back pain since 2004 since he was in the [facility]. He states that he sustained an injury while in the [employer]. His pain has progressively gotten worse over the last 20 years. He states his pain is in the small of his low back at his waistline. He rates this as a constant 6/10 aching, stabbing pain. His pain is made worse with any activities where he is on his feet especially with any twisting turning or bending. His pain is improved with rest and medications. 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 He denies any pain radiating further down his leg, associated leg numbness/tingling, focal leg weakness, saddle anesthesia, bowel/bladder incontinence episodes. He has tried physical therapy in the past, medical acupuncture and feels like these have not been very effective for him in the past.    History of Interventions:   Surgery: No previous lumbar surgeries  Injections: None    Current Treatment Medications:   Meloxicam 15 mg daily    Historical Treatment Medications:   None    Imaging/Studies:  CT L-spine (7/8/22)    PROCEDURE: CT LUMBAR SPINE WO CONTRAST       CLINICAL INFORMATION: Lumbar radiculopathy. Back pain. COMPARISON: No prior study. TECHNIQUE: 3 mm noncontrast axial images were obtained of the lumbar spine. Sagittal and coronal reconstructions were created. Angled images were reconstructed through the L3-4, L4-5 and L5-S1 disc levels. All CT scans at this facility use dose modulation, iterative reconstruction, and/or weight-based dosing when appropriate to reduce radiation dose to as low as reasonably achievable. FINDINGS:   There is minimal, grade 1, retrolisthesis of L2 relative to L3, L3 relative to L4 and L4 relative to L5 on the basis of degenerative change. There is otherwise anatomic vertebral body height and alignment. No fracture of the lumbar vertebral column is    evident. Paraspinal soft tissues are unremarkable. At T12-L1 there is no significant spinal canal or neuroforaminal stenosis. At L1-2 there is no significant spinal canal or neuroforaminal stenosis. At L2-3 there is a minimal disc bulge without significant spinal canal stenosis and mild bilateral neural foraminal stenosis in association with mild ligament flavum thickening. At L3-4 there is partial uncovering the disc with superimposed shallow disc bulge which causes mild spinal canal stenosis in association with ligamentum flavum thickening. There is mild to moderate bilateral neural foraminal stenosis. At L4-5 there is minimal partial tearing the disc with superimposed shallow disc bulge which causes mild spinal canal stenosis in association with ligamentum flavum thickening. There is mild to moderate bilateral foraminal stenosis. At L5-S1 there is no significant spinal canal or neuroforaminal stenosis. Impression   Overall mild degenerative changes of the lumbar spine with areas of mild spinal canal stenosis and up to mild to moderate neural foraminal stenosis.        Past Medical History:   Diagnosis Date    Anxiety     Hypertension     Low back pain     Sleep apnea        Past Surgical History:   Procedure Laterality Date    APPENDECTOMY      ROTATOR CUFF REPAIR      VASECTOMY      WISDOM TOOTH EXTRACTION         Family History   Problem Relation Age of Onset    No Known Problems Mother          Medications & Allergies:   Current Outpatient Medications   Medication Instructions    amLODIPine (NORVASC) 5 mg, Oral, DAILY    diclofenac sodium (VOLTAREN) 1 % GEL Topical, 3 TIMES DAILY    hydroCHLOROthiazide (HYDRODIURIL) 25 mg, Oral, DAILY    melatonin 10 mg, Oral, NIGHTLY    meloxicam (MOBIC) 15 mg, Oral, DAILY    naproxen (NAPROSYN) 500 mg, Oral, 2 TIMES DAILY    sertraline (ZOLOFT) 50 mg, Oral, DAILY       Allergies   Allergen Reactions    Morphine     Wound Dressings Itching, Rash and Swelling       Review of Systems:   Constitutional: negative for weight changes or fevers  Genitourinary: negative for bowel/bladder incontinence   Musculoskeletal: positive for low back pain  Neurological: negative for any leg weakness or numbness/tingling  Behavioral/Psych: negative for anxiety/depression   All other systems reviewed and are negative    Objective: There were no vitals filed for this visit. Constitutional: Pleasant, no acute distress   Head: Normocephalic, atraumatic   Eyes: Conjunctivae normal   Neck: Supple, symmetrical   Lungs: Normal respiratory effort, non-labored breathing   Cardiovascular: Limbs warm and well perfused   Abdomen: Non-protruded   Musculoskeletal: Muscle bulk symmetric, no atrophy, no gross deformities   · Lumbar Spine: ROM reduced in extension. Lumbar paraspinals tender to palpation bilaterally. SLR neg bilaterally. MARTY neg bilaterally. Positive facet loading bilaterally. Bilateral SI joints non-tender to palpation. Neurological: Cranial nerves II-XII grossly intact. · Gait - Slightly antalgic gait. Ambulates without assistive device.    · Motor: 5/5 muscle strength in bilateral hip flexion, knee flexion, knee extension, ankle dorsiflexion, and ankle plantar flexion   · Sensory: LT sensation intact in lower limbs   · Reflexes: 2+ symmetrical in bilateral achilles, 2+ bilateral patellar, negative ankle clonus, downgoing babinski   Skin: No rashes or lesions present   Psychological: Cooperative, no exaggerated pain behaviors       Assessment:    Diagnosis Orders   1. Lumbar spondylosis  CHG FLUOR NEEDLE/CATH SPINE/PARASPINAL DX/THER ADDON    KS INJ DX/THER AGNT PARAVERT FACET JOINT, LUMBAR/SAC, 1ST LEVEL    KS INJ DX/THER AGNT PARAVERT FACET JOINT, LUMBAR/SAC, 2ND LEVEL      2. Degenerative disc disease, lumbar        3. Other chronic pain        4. Myofascial pain            Savannah Hartley is a 39 y.o.male presenting to the pain clinic for evaluation of chronic low back pain. His clinical history and physical examination are consistent with lumbar facet mediated pain secondary to lumbar spondylosis and degenerative disc disease. I set him up for diagnostic lumbar medial branch blocks targeting bilateral L4/L5 and L5/S1 facet joints. Plan: The following treatment recommendations and plan were discussed in detail with Savannah Hartley. Imaging/Studies/Labs:   I have reviewed patients imaging of CT L-spine and results were discussed with patient today. Analgesics:   Patient is taking meloxicam. Patient is advised to take as prescribed and not take on an empty stomach. Adjuvants:   None; patient wants to proceed with injection therapy only. Interventions: In presence of lumbar axial back pain and with physical exam consistent for facetal pain, the option of medial branch blocks at bilateral L4/L5 and L5/S1  was chosen. The risks and benefits were discussed in detail with the patient. Patient wants to proceed with the injection. Anticoagulation/NPO Recommendations:   Patient will need to hold meloxicam x 4 days prior to the procedure. Patient will need to be NPO x 8 hours prior to the procedure. Plan to start an IV prior to the procedure. Multidisciplinary Pain Management:   In the presence of complex, chronic, and multi-factorial pain, the importance of a multidisciplinary approach to pain management in the patients management regimen was emphasized and discussed in great detail.    PHYSICAL THERAPY: Patient is advised to see a physical therapist for gentle stretching exercises and conditioning exercises for management of pain.      Referrals:  None    Prescriptions Written This Visit:   None    Follow-up: Lumbar MBBs      Krzysztof Swann,   Interventional Pain Management/PM&R   [place]

## 2022-11-14 NOTE — DISCHARGE INSTRUCTIONS

## 2022-11-15 ENCOUNTER — APPOINTMENT (OUTPATIENT)
Dept: GENERAL RADIOLOGY | Age: 37
End: 2022-11-15
Attending: ANESTHESIOLOGY
Payer: OTHER GOVERNMENT

## 2022-11-15 ENCOUNTER — HOSPITAL ENCOUNTER (OUTPATIENT)
Age: 37
Setting detail: OUTPATIENT SURGERY
Discharge: HOME | End: 2022-11-15
Attending: ANESTHESIOLOGY | Admitting: ANESTHESIOLOGY
Payer: OTHER GOVERNMENT

## 2022-11-15 VITALS
DIASTOLIC BLOOD PRESSURE: 105 MMHG | SYSTOLIC BLOOD PRESSURE: 169 MMHG | OXYGEN SATURATION: 96 % | RESPIRATION RATE: 16 BRPM | BODY MASS INDEX: 42.9 KG/M2 | WEIGHT: 306.4 LBS | HEIGHT: 71 IN | TEMPERATURE: 97.3 F | HEART RATE: 80 BPM

## 2022-11-15 PROCEDURE — 7100000010 HC PHASE II RECOVERY - FIRST 15 MIN: Performed by: ANESTHESIOLOGY

## 2022-11-15 PROCEDURE — 2709999900 HC NON-CHARGEABLE SUPPLY: Performed by: ANESTHESIOLOGY

## 2022-11-15 PROCEDURE — 99152 MOD SED SAME PHYS/QHP 5/>YRS: CPT | Performed by: ANESTHESIOLOGY

## 2022-11-15 PROCEDURE — 7100000011 HC PHASE II RECOVERY - ADDTL 15 MIN: Performed by: ANESTHESIOLOGY

## 2022-11-15 PROCEDURE — 3600000056 HC PAIN LEVEL 4 BASE: Performed by: ANESTHESIOLOGY

## 2022-11-15 PROCEDURE — 64493 INJ PARAVERT F JNT L/S 1 LEV: CPT | Performed by: ANESTHESIOLOGY

## 2022-11-15 PROCEDURE — 6360000002 HC RX W HCPCS: Performed by: ANESTHESIOLOGY

## 2022-11-15 PROCEDURE — 64494 INJ PARAVERT F JNT L/S 2 LEV: CPT | Performed by: ANESTHESIOLOGY

## 2022-11-15 PROCEDURE — 3209999900 FLUORO FOR SURGICAL PROCEDURES

## 2022-11-15 PROCEDURE — 2500000003 HC RX 250 WO HCPCS: Performed by: ANESTHESIOLOGY

## 2022-11-15 RX ORDER — FENTANYL CITRATE 50 UG/ML
INJECTION, SOLUTION INTRAMUSCULAR; INTRAVENOUS PRN
Status: DISCONTINUED | OUTPATIENT
Start: 2022-11-15 | End: 2022-11-15 | Stop reason: ALTCHOICE

## 2022-11-15 RX ORDER — BUPIVACAINE HYDROCHLORIDE 5 MG/ML
INJECTION, SOLUTION PERINEURAL PRN
Status: DISCONTINUED | OUTPATIENT
Start: 2022-11-15 | End: 2022-11-15 | Stop reason: ALTCHOICE

## 2022-11-15 RX ORDER — LIDOCAINE HYDROCHLORIDE 10 MG/ML
INJECTION, SOLUTION EPIDURAL; INFILTRATION; INTRACAUDAL; PERINEURAL PRN
Status: DISCONTINUED | OUTPATIENT
Start: 2022-11-15 | End: 2022-11-15 | Stop reason: ALTCHOICE

## 2022-11-15 RX ORDER — MIDAZOLAM HYDROCHLORIDE 1 MG/ML
INJECTION INTRAMUSCULAR; INTRAVENOUS PRN
Status: DISCONTINUED | OUTPATIENT
Start: 2022-11-15 | End: 2022-11-15 | Stop reason: ALTCHOICE

## 2022-11-15 ASSESSMENT — PAIN DESCRIPTION - DESCRIPTORS: DESCRIPTORS: STABBING

## 2022-11-15 ASSESSMENT — PAIN - FUNCTIONAL ASSESSMENT: PAIN_FUNCTIONAL_ASSESSMENT: 0-10

## 2022-11-15 NOTE — PROGRESS NOTES
1347 To recovery via cart. SPont resp. VSS. Report received from surgical rn. Pt denies pain numbness tingling or nausea. HOB elevated. Snack and drink given. Call bell in reach  1400 IV discontinued.  Up to dress self  1406 Discharge to home in stable ambulatory condition with wife

## 2022-11-15 NOTE — POST SEDATION
[facility]  Sedation/Analgesia Post Sedation Record    Pt Name: Helio Anne  MRN: [id number]  YOB: 1985  Procedure Performed By: Riaz Morgan DO  Primary Care Physician: WARREN Guillen, MSN, APRN - CNP    POST-PROCEDURE    Physicians/Assistants: Riaz Morgan DO  Procedure Performed: See Procedure Note   Sedation/Anesthesia: Versed and Fentanyl (See procedure note for amount and duration)  Estimated Blood Loss:     0  ml  Specimens Removed: None        Complications: None           Krzysztof Calixto DO  Electronically signed 11/15/2022 at 3:38 PM

## 2022-11-15 NOTE — PRE SEDATION
[address] [address]  Pre-Sedation/Analgesia History & Physical    Pt Name: Milan Post  MRN: [id number]  YOB: 1985  Provider Performing Procedure: Hershal Lesch, DO   Primary Care Physician: Subhash Briones, WARREN, MSN, APRN - CNP      MEDICAL HISTORY       has a past medical history of Anxiety, Hypertension, Low back pain, and Sleep apnea. SURGICAL HISTORY   has a past surgical history that includes Appendectomy; Vasectomy; [place] tooth extraction; and Rotator cuff repair. ALLERGIES   Allergies as of 10/17/2022 - Fully Reviewed 10/17/2022   Allergen Reaction Noted    Morphine  10/11/2014    Wound dressings Itching, Rash, and Swelling 06/27/2022       MEDICATIONS   Prior to Admission medications    Medication Sig Start Date End Date Taking? Authorizing Provider   hydroCHLOROthiazide (HYDRODIURIL) 25 MG tablet Take 25 mg by mouth daily 6/15/22   Historical Provider, MD   diclofenac sodium (VOLTAREN) 1 % GEL Apply topically 3 times daily 4/4/22   Historical Provider, MD   sertraline (ZOLOFT) 50 MG tablet Take 50 mg by mouth daily    Historical Provider, MD   meloxicam (MOBIC) 15 MG tablet Take 15 mg by mouth daily    Historical Provider, MD   amLODIPine (NORVASC) 5 MG tablet Take 5 mg by mouth daily    Historical Provider, MD   melatonin 10 MG CAPS capsule Take 10 mg by mouth nightly    Historical Provider, MD   naproxen (NAPROSYN) 500 MG tablet Take 1 tablet by mouth 2 times daily. Patient not taking: Reported on 10/17/2022 10/11/14   LISA Walsh     PHYSICAL:   Vitals:    11/15/22 1348   BP: (!) 169/105   Pulse:    Resp:    Temp:    SpO2:      PLANNED PROCEDURE   See procedure note  SEDATION  Planned agent: Versed and Fentanyl  ASA Classification: 1  Class 1: A normal healthy patient  Class 2: Pt with mild to moderate systemic disease  Class 3: Severe systemic disease or disturbance  Class 4: Severe systemic disorders that are already life threatening.   Class 5: Moribund pt with little chances of survival, for more than 24 hours. Mallampati I Airway Classification: 1    1. Pre-procedure diagnostic studies complete and results available. 2. Previous sedation/anesthesia experiences assessed. 3. The patient is an appropriate candidate to undergo the planned procedure sedation and anesthesia. (Refer to nursing sedation/analgesia documentation record)  4. Formulation and discussion of sedation/procedure plan, risks, and expectations with patient and/or responsible adult completed. 5. Patient examined immediately prior to the procedure.  (Refer to nursing sedation/analgesia documentation record)    Lazarus De La O DO  Electronically signed 11/15/2022 at 3:37 PM

## 2022-11-21 NOTE — PROGRESS NOTES
Chronic Pain/PM&R Clinic Note     Encounter Date: 11/22/22    Subjective:   Chief Complaint:   Chief Complaint   Patient presents with    Follow Up After Procedure     medial branch blocks  bilateral Lumbar 4/5, 5/Sacral 1 11/15/22       History of Present Illness:   Ron Alfonso is a 39 y.o. male seen in the clinic initially on 11/22/22 upon request from LUIS ANTONIO Mckay for his history of chronic low back pain. He has been dealing with low back pain since 2004 since he was in the [facility]. He states that he sustained an injury while in the [employer]. His pain has progressively gotten worse over the last 20 years. He states his pain is in the small of his low back at his waistline. He rates this as a constant 6/10 aching, stabbing pain. His pain is made worse with any activities where he is on his feet especially with any twisting turning or bending. His pain is improved with rest and medications. 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 He denies any pain radiating further down his leg, associated leg numbness/tingling, focal leg weakness, saddle anesthesia, bowel/bladder incontinence episodes. He has tried physical therapy in the past, medical acupuncture and feels like these have not been very effective for him in the past.    Today, 11/22/2022, patient presents for planned follow up on low back pain. Patient underwent the lumbar medial branch blocks at L4-5 and L5-S1 on 11/15/2022. He states he received minimal relief from this injection. He did have some increased muscle soreness after the procedure which has been lingering. He states he just finished physical therapy which does help for short time but does not last.  He is questioning whether he should pursue a consult in regards to surgery, as he has been dealing with this for very long time and it is tiring.   He is open to exhausting all options prior to pursuing Intake letter mailed with  intake packet to the mailing address on file  Message will be deferred for 4 weeks  surgical intervention. He denies any new symptoms other than the increased muscle soreness. History of Interventions:   Surgery: No previous lumbar surgeries  Injections: TPI - ineffective  Lumbar MBB L4/5 and L5/S1 (11/15/2022) - minimal relief    Current Treatment Medications:   Meloxicam 15 mg daily    Historical Treatment Medications:   None    Imaging/Studies:  CT L-spine (7/8/22)    PROCEDURE: CT LUMBAR SPINE WO CONTRAST       CLINICAL INFORMATION: Lumbar radiculopathy. Back pain. COMPARISON: No prior study. TECHNIQUE: 3 mm noncontrast axial images were obtained of the lumbar spine. Sagittal and coronal reconstructions were created. Angled images were reconstructed through the L3-4, L4-5 and L5-S1 disc levels. All CT scans at this facility use dose modulation, iterative reconstruction, and/or weight-based dosing when appropriate to reduce radiation dose to as low as reasonably achievable. FINDINGS:   There is minimal, grade 1, retrolisthesis of L2 relative to L3, L3 relative to L4 and L4 relative to L5 on the basis of degenerative change. There is otherwise anatomic vertebral body height and alignment. No fracture of the lumbar vertebral column is    evident. Paraspinal soft tissues are unremarkable. At T12-L1 there is no significant spinal canal or neuroforaminal stenosis. At L1-2 there is no significant spinal canal or neuroforaminal stenosis. At L2-3 there is a minimal disc bulge without significant spinal canal stenosis and mild bilateral neural foraminal stenosis in association with mild ligament flavum thickening. At L3-4 there is partial uncovering the disc with superimposed shallow disc bulge which causes mild spinal canal stenosis in association with ligamentum flavum thickening. There is mild to moderate bilateral neural foraminal stenosis.    At L4-5 there is minimal partial tearing the disc with superimposed shallow disc bulge which causes mild spinal canal stenosis in association with ligamentum flavum thickening. There is mild to moderate bilateral foraminal stenosis. At L5-S1 there is no significant spinal canal or neuroforaminal stenosis. Impression   Overall mild degenerative changes of the lumbar spine with areas of mild spinal canal stenosis and up to mild to moderate neural foraminal stenosis.        Past Medical History:   Diagnosis Date    Anxiety     Hypertension     Low back pain     Sleep apnea        Past Surgical History:   Procedure Laterality Date    APPENDECTOMY      PAIN MANAGEMENT PROCEDURE Bilateral 11/15/2022    medial branch blocks  bilateral Lumbar 4/5, 5/Sacral 1 performed by Yodit Mane DO at [address] [address]      VASECTOMY      WISDOM TOOTH EXTRACTION         Family History   Problem Relation Age of Onset    No Known Problems Mother          Medications & Allergies:   Current Outpatient Medications   Medication Instructions    amLODIPine (NORVASC) 5 mg, Oral, DAILY    diclofenac sodium (VOLTAREN) 1 % GEL Topical, 3 TIMES DAILY    hydroCHLOROthiazide (HYDRODIURIL) 25 mg, Oral, DAILY    melatonin 10 mg, Oral, NIGHTLY    meloxicam (MOBIC) 15 mg, Oral, DAILY    naproxen (NAPROSYN) 500 mg, Oral, 2 TIMES DAILY    sertraline (ZOLOFT) 50 mg, Oral, DAILY       Allergies   Allergen Reactions    Morphine     Wound Dressings Itching, Rash and Swelling       Review of Systems:   Constitutional: negative for weight changes or fevers  Genitourinary: negative for bowel/bladder incontinence   Musculoskeletal: positive for low back pain  Neurological: negative for any leg weakness or numbness/tingling  Behavioral/Psych: negative for anxiety/depression   All other systems reviewed and are negative    Objective:     Vitals:    11/22/22 0826   BP: (!) 155/80       Constitutional: Pleasant, no acute distress   Head: Normocephalic, atraumatic   Eyes: Conjunctivae normal   Neck: Supple, symmetrical   Lungs: Normal respiratory effort, non-labored breathing   Cardiovascular: Limbs warm and well perfused   Abdomen: Non-protruded   Musculoskeletal: Muscle bulk symmetric, no atrophy, no gross deformities   · Lumbar Spine: ROM reduced in extension. Lumbar paraspinals tender to palpation bilaterally. SLR neg bilaterally. MARTY neg bilaterally. Positive facet loading bilaterally. Bilateral SI joints non-tender to palpation. Neurological: Cranial nerves II-XII grossly intact. · Gait - Slightly antalgic gait. Ambulates without assistive device. · Motor: 5/5 muscle strength in bilateral hip flexion, knee flexion, knee extension, ankle dorsiflexion, and ankle plantar flexion   · Sensory: LT sensation intact in lower limbs   · Reflexes: 2+ symmetrical in bilateral achilles, 2+ bilateral patellar, negative ankle clonus, downgoing babinski   Skin: No rashes or lesions present   Psychological: Cooperative, no exaggerated pain behaviors       Assessment:    Diagnosis Orders   1. Lumbar spondylosis  CHG FLUOR NEEDLE/CATH SPINE/PARASPINAL DX/THER ADDON    CA INJ DX/THER AGNT PARAVERT FACET JOINT, LUMBAR/SAC, 1ST LEVEL    CA INJ DX/THER AGNT PARAVERT FACET JOINT, LUMBAR/SAC, 2ND LEVEL      2. Degenerative disc disease, lumbar        3. Other chronic pain        4. Myofascial pain              Milan Post is a 39 y.o.male presenting to the pain clinic for evaluation of chronic low back pain. His clinical history and physical examination are consistent with lumbar facet mediated pain secondary to lumbar spondylosis and degenerative disc disease. I set him up for diagnostic lumbar medial branch blocks targeting bilateral L4/L5 and L5/S1 facet joints. Patient had a minimal response to the diagnostic lumbar MBB at L4/5 and L5/S1. We discussed potential options and he decided to pursue the confirmatory lumbar MBBs and potentially the lumbar RFA before referral for potential back surgery.  We will follow up after his next lumbar MBB to determine next steps. Plan: The following treatment recommendations and plan were discussed in detail with Reuben Dixon. Imaging/Studies/Labs:   I have reviewed patients imaging of CT L-spine and results were discussed with patient today. Analgesics:   Patient is taking meloxicam. Patient is advised to take as prescribed and not take on an empty stomach. Adjuvants:   None; patient wants to proceed with injection therapy only. Interventions: In presence of lumbar axial back pain and with physical exam consistent for facetal pain, the option of confirmatory medial branch blocks at bilateral L4/L5 and L5/S1  was chosen. The risks and benefits were discussed in detail with the patient. Patient wants to proceed with the injection. Anticoagulation/NPO Recommendations:   Patient will need to hold meloxicam x 4 days prior to the procedure. Patient will need to be NPO x 8 hours prior to the procedure. Plan to start an IV prior to the procedure. Multidisciplinary Pain Management:   In the presence of complex, chronic, and multi-factorial pain, the importance of a multidisciplinary approach to pain management in the patients management regimen was emphasized and discussed in great detail. PHYSICAL THERAPY: Patient is advised to see a physical therapist for gentle stretching exercises and conditioning exercises for management of pain.      Referrals:  None    Prescriptions Written This Visit:   None    Follow-up: Confirmatory Lumbar MBBs      Ruchi Purcell, APRN - CNP  Interventional Pain Management/PM&R   [place]

## 2022-11-22 ENCOUNTER — OFFICE VISIT (OUTPATIENT)
Dept: PHYSICAL MEDICINE AND REHAB | Age: 37
End: 2022-11-22
Payer: OTHER GOVERNMENT

## 2022-11-22 VITALS
WEIGHT: 306 LBS | HEIGHT: 71 IN | DIASTOLIC BLOOD PRESSURE: 80 MMHG | SYSTOLIC BLOOD PRESSURE: 155 MMHG | BODY MASS INDEX: 42.84 KG/M2

## 2022-11-22 DIAGNOSIS — G89.29 OTHER CHRONIC PAIN: ICD-10-CM

## 2022-11-22 DIAGNOSIS — M79.18 MYOFASCIAL PAIN: ICD-10-CM

## 2022-11-22 DIAGNOSIS — M51.36 DEGENERATIVE DISC DISEASE, LUMBAR: ICD-10-CM

## 2022-11-22 DIAGNOSIS — M47.816 LUMBAR SPONDYLOSIS: Primary | ICD-10-CM

## 2022-11-22 PROCEDURE — 99214 OFFICE O/P EST MOD 30 MIN: CPT | Performed by: NURSE PRACTITIONER

## 2022-12-14 ENCOUNTER — PREP FOR PROCEDURE (OUTPATIENT)
Dept: PHYSICAL MEDICINE AND REHAB | Age: 37
End: 2022-12-14

## 2022-12-15 NOTE — DISCHARGE INSTRUCTIONS

## 2022-12-19 NOTE — H&P
Today, patient presents for planned confirmatory medial branch blocks at bilateral L4/L5 and L5/S1    This note is reflective of the patient's previous visit for evaluation. We will proceed with today's planned procedure. Since patient's last visit for evaluation, there have been no interval changes in medical history. Patient has no new numbness, weakness, or focal neurological deficit since evaluation. Patient has no contraindications to injection (no anticoagulation or recent antibiotic intake for active infections), and has a  present or is able to drive themselves (as discussed and cleared by physician). Allergies to latex, contrast dye, and steroid medications have been confirmed with the patient prior to the procedure. NPO necessity has been assessed and accepted based on procedure complexity. The risks and benefits of the procedure have been explained including but are not limited to infection, bleeding, paralysis, immediate post procedure weakness, and dizziness; the patient acknowledges understanding and desires to proceed with the procedure. Patient has signed consent for same procedure as discussed in previous clinic encounter. All other questions and concerns were addressed at bedside. See procedure note for full details. Post procedure Instructions: The patient was advised not to drive during the day of the procedure and not to engage in any significant decision making (unless otherwise states by physician). The patient was also advised to be cautious with walking/activity for 24 hours following today's visit and asked not to engage in over-exertion (unless otherwise states by physician). After this time, it is ok to resume pre-procedure level of activity. Patient advised to apply ice to site of injection in situations of pain and discomfort. Patient advised to not submerge site of injection during bath or pool activities for approximately 24 hours post-procedure.  Patient attested to understanding post procedure directions / restrictions. All other questions and concerns addressed before patient discharge in ambulatory fashion. Chronic Pain/PM&R Clinic Note     Encounter Date: 11/22/22    Subjective:   Chief Complaint:   No chief complaint on file. History of Present Illness:   Randy Quintanilla is a 44 y.o. male seen in the clinic initially on 12/18/22 upon request from LUIS ANTONIO Anderson for his history of chronic low back pain. He has been dealing with low back pain since 2004 since he was in the [employer]. He states that he sustained an injury while in the [employer]. His pain has progressively gotten worse over the last 20 years. He states his pain is in the small of his low back at his waistline. He rates this as a constant 6/10 aching, stabbing pain. His pain is made worse with any activities where he is on his feet especially with any twisting turning or bending. His pain is improved with rest and medications. 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 He denies any pain radiating further down his leg, associated leg numbness/tingling, focal leg weakness, saddle anesthesia, bowel/bladder incontinence episodes. He has tried physical therapy in the past, medical acupuncture and feels like these have not been very effective for him in the past.    Today, 11/22/2022, patient presents for planned follow up on low back pain. Patient underwent the lumbar medial branch blocks at L4-5 and L5-S1 on 11/15/2022. He states he received minimal relief from this injection. He did have some increased muscle soreness after the procedure which has been lingering. He states he just finished physical therapy which does help for short time but does not last.  He is questioning whether he should pursue a consult in regards to surgery, as he has been dealing with this for very long time and it is tiring.   He is open to exhausting all options prior to pursuing surgical intervention. He denies any new symptoms other than the increased muscle soreness. History of Interventions:   Surgery: No previous lumbar surgeries  Injections: TPI - ineffective  Lumbar MBB L4/5 and L5/S1 (11/15/2022) - minimal relief    Current Treatment Medications:   Meloxicam 15 mg daily    Historical Treatment Medications:   None    Imaging/Studies:  CT L-spine (7/8/22)    PROCEDURE: CT LUMBAR SPINE WO CONTRAST       CLINICAL INFORMATION: Lumbar radiculopathy. Back pain. COMPARISON: No prior study. TECHNIQUE: 3 mm noncontrast axial images were obtained of the lumbar spine. Sagittal and coronal reconstructions were created. Angled images were reconstructed through the L3-4, L4-5 and L5-S1 disc levels. All CT scans at this facility use dose modulation, iterative reconstruction, and/or weight-based dosing when appropriate to reduce radiation dose to as low as reasonably achievable. FINDINGS:   There is minimal, grade 1, retrolisthesis of L2 relative to L3, L3 relative to L4 and L4 relative to L5 on the basis of degenerative change. There is otherwise anatomic vertebral body height and alignment. No fracture of the lumbar vertebral column is    evident. Paraspinal soft tissues are unremarkable. At T12-L1 there is no significant spinal canal or neuroforaminal stenosis. At L1-2 there is no significant spinal canal or neuroforaminal stenosis. At L2-3 there is a minimal disc bulge without significant spinal canal stenosis and mild bilateral neural foraminal stenosis in association with mild ligament flavum thickening. At L3-4 there is partial uncovering the disc with superimposed shallow disc bulge which causes mild spinal canal stenosis in association with ligamentum flavum thickening. There is mild to moderate bilateral neural foraminal stenosis.    At L4-5 there is minimal partial tearing the disc with superimposed shallow disc bulge which causes mild spinal canal stenosis in association with ligamentum flavum thickening. There is mild to moderate bilateral foraminal stenosis. At L5-S1 there is no significant spinal canal or neuroforaminal stenosis. Impression   Overall mild degenerative changes of the lumbar spine with areas of mild spinal canal stenosis and up to mild to moderate neural foraminal stenosis. Past Medical History:   Diagnosis Date    Anxiety     Hypertension     Low back pain     Sleep apnea        Past Surgical History:   Procedure Laterality Date    APPENDECTOMY      PAIN MANAGEMENT PROCEDURE Bilateral 11/15/2022    medial branch blocks  bilateral Lumbar 4/5, 5/Sacral 1 performed by Joseph Dumont DO at [address] [place] [address]      VASECTOMY      WISDOM TOOTH EXTRACTION         Family History   Problem Relation Age of Onset    No Known Problems Mother          Medications & Allergies:   Current Outpatient Medications   Medication Instructions    amLODIPine (NORVASC) 5 mg, Oral, DAILY    diclofenac sodium (VOLTAREN) 1 % GEL Topical, 3 TIMES DAILY    hydroCHLOROthiazide (HYDRODIURIL) 25 mg, Oral, DAILY    melatonin 10 mg, Oral, NIGHTLY    meloxicam (MOBIC) 15 mg, Oral, DAILY    naproxen (NAPROSYN) 500 mg, Oral, 2 TIMES DAILY    sertraline (ZOLOFT) 50 mg, Oral, DAILY       Allergies   Allergen Reactions    Morphine     Wound Dressings Itching, Rash and Swelling       Review of Systems:   Constitutional: negative for weight changes or fevers  Genitourinary: negative for bowel/bladder incontinence   Musculoskeletal: positive for low back pain  Neurological: negative for any leg weakness or numbness/tingling  Behavioral/Psych: negative for anxiety/depression   All other systems reviewed and are negative    Objective: There were no vitals filed for this visit.       Constitutional: Pleasant, no acute distress   Head: Normocephalic, atraumatic   Eyes: Conjunctivae normal   Neck: Supple, symmetrical   Lungs: Normal respiratory effort, non-labored breathing   Cardiovascular: Limbs warm and well perfused   Abdomen: Non-protruded   Musculoskeletal: Muscle bulk symmetric, no atrophy, no gross deformities   · Lumbar Spine: ROM reduced in extension. Lumbar paraspinals tender to palpation bilaterally. SLR neg bilaterally. MARTY neg bilaterally. Positive facet loading bilaterally. Bilateral SI joints non-tender to palpation. Neurological: Cranial nerves II-XII grossly intact. · Gait - Slightly antalgic gait. Ambulates without assistive device. · Motor: 5/5 muscle strength in bilateral hip flexion, knee flexion, knee extension, ankle dorsiflexion, and ankle plantar flexion   · Sensory: LT sensation intact in lower limbs   · Reflexes: 2+ symmetrical in bilateral achilles, 2+ bilateral patellar, negative ankle clonus, downgoing babinski   Skin: No rashes or lesions present   Psychological: Cooperative, no exaggerated pain behaviors       Assessment:    Diagnosis Orders   1. Lumbar spondylosis  CHG FLUOR NEEDLE/CATH SPINE/PARASPINAL DX/THER ADDON    MS INJ DX/THER AGNT PARAVERT FACET JOINT, LUMBAR/SAC, 1ST LEVEL    MS INJ DX/THER AGNT PARAVERT FACET JOINT, LUMBAR/SAC, 2ND LEVEL      2. Degenerative disc disease, lumbar        3. Other chronic pain        4. Myofascial pain              Dc Reese is a 44 y.o.male presenting to the pain clinic for evaluation of chronic low back pain. His clinical history and physical examination are consistent with lumbar facet mediated pain secondary to lumbar spondylosis and degenerative disc disease. I set him up for diagnostic lumbar medial branch blocks targeting bilateral L4/L5 and L5/S1 facet joints. Patient had a minimal response to the diagnostic lumbar MBB at L4/5 and L5/S1. We discussed potential options and he decided to pursue the confirmatory lumbar MBBs and potentially the lumbar RFA before referral for potential back surgery.  We will follow up after his next lumbar MBB to determine next steps. Plan: The following treatment recommendations and plan were discussed in detail with Tevin Chaudhary. Imaging/Studies/Labs:   I have reviewed patients imaging of CT L-spine and results were discussed with patient today. Analgesics:   Patient is taking meloxicam. Patient is advised to take as prescribed and not take on an empty stomach. Adjuvants:   None; patient wants to proceed with injection therapy only. Interventions: In presence of lumbar axial back pain and with physical exam consistent for facetal pain, the option of confirmatory medial branch blocks at bilateral L4/L5 and L5/S1  was chosen. The risks and benefits were discussed in detail with the patient. Patient wants to proceed with the injection. Anticoagulation/NPO Recommendations:   Patient will need to hold meloxicam x 4 days prior to the procedure. Patient will need to be NPO x 8 hours prior to the procedure. Plan to start an IV prior to the procedure. Multidisciplinary Pain Management:   In the presence of complex, chronic, and multi-factorial pain, the importance of a multidisciplinary approach to pain management in the patients management regimen was emphasized and discussed in great detail. PHYSICAL THERAPY: Patient is advised to see a physical therapist for gentle stretching exercises and conditioning exercises for management of pain.      Referrals:  None    Prescriptions Written This Visit:   None    Follow-up: Confirmatory Lumbar MBBs      Krzysztof Cornell, DO  Interventional Pain Management/PM&R   [place]

## 2022-12-20 ENCOUNTER — APPOINTMENT (OUTPATIENT)
Dept: GENERAL RADIOLOGY | Age: 37
End: 2022-12-20
Attending: ANESTHESIOLOGY
Payer: OTHER GOVERNMENT

## 2022-12-20 ENCOUNTER — HOSPITAL ENCOUNTER (OUTPATIENT)
Age: 37
Setting detail: OUTPATIENT SURGERY
Discharge: HOME | End: 2022-12-20
Attending: ANESTHESIOLOGY | Admitting: ANESTHESIOLOGY
Payer: OTHER GOVERNMENT

## 2022-12-20 VITALS
SYSTOLIC BLOOD PRESSURE: 140 MMHG | OXYGEN SATURATION: 96 % | DIASTOLIC BLOOD PRESSURE: 87 MMHG | TEMPERATURE: 97.5 F | HEIGHT: 71 IN | HEART RATE: 77 BPM | RESPIRATION RATE: 18 BRPM | WEIGHT: 303.4 LBS | BODY MASS INDEX: 42.48 KG/M2

## 2022-12-20 PROCEDURE — 3600000056 HC PAIN LEVEL 4 BASE: Performed by: ANESTHESIOLOGY

## 2022-12-20 PROCEDURE — 3209999900 FLUORO FOR SURGICAL PROCEDURES

## 2022-12-20 PROCEDURE — 7100000010 HC PHASE II RECOVERY - FIRST 15 MIN: Performed by: ANESTHESIOLOGY

## 2022-12-20 PROCEDURE — 7100000011 HC PHASE II RECOVERY - ADDTL 15 MIN: Performed by: ANESTHESIOLOGY

## 2022-12-20 PROCEDURE — 2500000003 HC RX 250 WO HCPCS: Performed by: ANESTHESIOLOGY

## 2022-12-20 PROCEDURE — 99152 MOD SED SAME PHYS/QHP 5/>YRS: CPT | Performed by: ANESTHESIOLOGY

## 2022-12-20 PROCEDURE — 6360000002 HC RX W HCPCS: Performed by: ANESTHESIOLOGY

## 2022-12-20 PROCEDURE — 6370000000 HC RX 637 (ALT 250 FOR IP): Performed by: ANESTHESIOLOGY

## 2022-12-20 PROCEDURE — 2709999900 HC NON-CHARGEABLE SUPPLY: Performed by: ANESTHESIOLOGY

## 2022-12-20 RX ORDER — BUPIVACAINE HYDROCHLORIDE 5 MG/ML
INJECTION, SOLUTION PERINEURAL PRN
Status: DISCONTINUED | OUTPATIENT
Start: 2022-12-20 | End: 2022-12-20 | Stop reason: ALTCHOICE

## 2022-12-20 RX ORDER — FENTANYL CITRATE 50 UG/ML
INJECTION, SOLUTION INTRAMUSCULAR; INTRAVENOUS PRN
Status: DISCONTINUED | OUTPATIENT
Start: 2022-12-20 | End: 2022-12-20 | Stop reason: ALTCHOICE

## 2022-12-20 RX ORDER — HYDROCODONE BITARTRATE AND ACETAMINOPHEN 5; 325 MG/1; MG/1
1 TABLET ORAL EVERY 6 HOURS PRN
Status: DISCONTINUED | OUTPATIENT
Start: 2022-12-20 | End: 2022-12-20 | Stop reason: HOSPADM

## 2022-12-20 RX ORDER — MIDAZOLAM HYDROCHLORIDE 1 MG/ML
INJECTION INTRAMUSCULAR; INTRAVENOUS PRN
Status: DISCONTINUED | OUTPATIENT
Start: 2022-12-20 | End: 2022-12-20 | Stop reason: ALTCHOICE

## 2022-12-20 RX ORDER — LIDOCAINE HYDROCHLORIDE 10 MG/ML
INJECTION, SOLUTION EPIDURAL; INFILTRATION; INTRACAUDAL; PERINEURAL PRN
Status: DISCONTINUED | OUTPATIENT
Start: 2022-12-20 | End: 2022-12-20 | Stop reason: ALTCHOICE

## 2022-12-20 RX ADMIN — HYDROCODONE BITARTRATE AND ACETAMINOPHEN 1 TABLET: 5; 325 TABLET ORAL at 14:00

## 2022-12-20 ASSESSMENT — PAIN SCALES - GENERAL
PAINLEVEL_OUTOF10: 8
PAINLEVEL_OUTOF10: 8

## 2022-12-20 ASSESSMENT — PAIN DESCRIPTION - DESCRIPTORS: DESCRIPTORS: SHARP

## 2022-12-20 ASSESSMENT — PAIN - FUNCTIONAL ASSESSMENT: PAIN_FUNCTIONAL_ASSESSMENT: 0-10

## 2022-12-20 ASSESSMENT — PAIN DESCRIPTION - ORIENTATION: ORIENTATION: LOWER

## 2022-12-20 ASSESSMENT — PAIN DESCRIPTION - LOCATION: LOCATION: BACK

## 2022-12-20 NOTE — PROGRESS NOTES
1338 Patient arrived to phase II via cart. Spontaneous respiraitons even and unlabored. Placed on monitor--VSS. Report received from OR RN  1339 Assessment completed. Patient is alert and oriented x4. IV capped off-- no complications. Injection sites clean and dry. 1341 Snack and drink provided. 1345 Pt. States pain 8/10 and requests pain relief. 46 Dr. Magy Yung notified of pt.'s pain level. Orders received. [address] notified Dr. Magy Yung of pt.'s Morphine allergy and that pt. States taking Norco in the past without issues. Dr. Magy Yung states ok to continue with Order. 1400 PRN pain medication given per order. 1403 Additional drink provided. [address] at bedside. Pt. Denies issues. Pt. Also denies relief. 1418 Ice pack applied to site. 26 Family brought to bedside. 1430 RN at bedside. Pt. States slight improvement in pain. 1431 Pt. Standing with standby assist of RN. Pt. Denies weakness or dizziness. 56 Family left to get private vehicle. INT removed. No complications noted. 1434 Pt. Getting self dressed. 1435 Pt. Ambulated to private vehicle in stable condition with RN at his side.

## 2022-12-20 NOTE — PROCEDURES
Pre-operative Diagnosis: Lumbar facet pain     Post-operative Diagnosis: Lumbar facet pain     Procedure: Bilateral lumbar medial branch blocks targeting facet joints of L4/L5 and L5/S1     Procedure Description:  After consent was obtained, the patient was placed in the prone position with a pillow under the abdomen to reduce the lordotic curve of the lumbar spine. The lower back was prepped with chloraprep and draped in a sterile fashion. Then, 0.5 cc of 1 % lidocaine was used for local anesthesia of the skin and superficial subcutaneous tissues. Three 22-gauge 3.5-inch spinal needles were advanced under fluoroscopy in an AP view: one at the sacral ala and two at the junction of the right superior articular process and the transverse process of the L4 and L5 vertebrae. There was no paresthesias, heme, or CSF obtained. Needle placement was confirmed using AP and oblique views. Then, 0.5 cc of 0.5% bupivacaine was injected at each site without complications. The procedure was repeated on the contralateral side. The patient tolerated the procedure well, transported to the recovery room, and discharged in ambulatory fashion.      Procedural Complications: None  Estimated Blood Loss: 0 mL        IV sedation was used during the procedure:  - Moderate intravenous conscious sedation was supervised by Dr. Percy Delacruz  - The patient was independently monitored by a Registered Nurse assigned to the procedure room  - Monitoring included automated blood pressure, continuous EKG, and continuous pulse oximetry  - The detailed conscious record is permanently stored in the [place] [id number]  - The following is the conscious sedation record:  Start Time: 13:27  End Time : 13:42  Duration: 15 minutes   Medications Administered: 2 mg Versed, 100 mcg Fentanyl     Krzysztof Holbrook DO  Interventional Pain Management/PM&R   [facility]

## 2022-12-20 NOTE — POST SEDATION
[address] [address]  Sedation/Analgesia Post Sedation Record    Pt Name: Chrissy Keenan  MRN: [id number]  YOB: 1985  Procedure Performed By: Ayana Barrios DO  Primary Care Physician: Jaime Malik, NEEMAP, MSN, APRN - CNP    POST-PROCEDURE    Physicians/Assistants: Ayana Barrios DO  Procedure Performed: See Procedure Note   Sedation/Anesthesia: Versed and Fentanyl (See procedure note for amount and duration)  Estimated Blood Loss:     0  ml  Specimens Removed: None        Complications: None           Krzysztof Ram DO  Electronically signed 12/20/2022 at 2:37 PM

## 2022-12-20 NOTE — PRE SEDATION
[facility]  Pre-Sedation/Analgesia History & Physical    Pt Name: Sarath Armando  MRN: [id number]  YOB: 1985  Provider Performing Procedure: Karen Mcguire DO   Primary Care Physician: Jessy Yeboah, NEEMAP, MSN, APRN - CNP      MEDICAL HISTORY       has a past medical history of Anxiety, Hypertension, Low back pain, and Sleep apnea. SURGICAL HISTORY   has a past surgical history that includes Appendectomy; Vasectomy; [place] tooth extraction; Rotator cuff repair; and Pain management procedure (Bilateral, 11/15/2022). ALLERGIES   Allergies as of 11/22/2022 - Fully Reviewed 11/22/2022   Allergen Reaction Noted    Morphine  10/11/2014    Wound dressings Itching, Rash, and Swelling 06/27/2022       MEDICATIONS   Prior to Admission medications    Medication Sig Start Date End Date Taking? Authorizing Provider   hydroCHLOROthiazide (HYDRODIURIL) 25 MG tablet Take 25 mg by mouth daily 6/15/22   Historical Provider, MD   diclofenac sodium (VOLTAREN) 1 % GEL Apply topically 3 times daily 4/4/22   Historical Provider, MD   sertraline (ZOLOFT) 50 MG tablet Take 50 mg by mouth daily    Historical Provider, MD   meloxicam (MOBIC) 15 MG tablet Take 15 mg by mouth daily    Historical Provider, MD   amLODIPine (NORVASC) 5 MG tablet Take 5 mg by mouth daily    Historical Provider, MD   melatonin 10 MG CAPS capsule Take 10 mg by mouth nightly    Historical Provider, MD   naproxen (NAPROSYN) 500 MG tablet Take 1 tablet by mouth 2 times daily.  10/11/14   LISA Price     PHYSICAL:   Vitals:    12/20/22 1338   BP: (!) 140/87   Pulse: 77   Resp: 18   Temp: 97.5 °F (36.4 °C)   SpO2: 96%     PLANNED PROCEDURE   See procedure note  SEDATION  Planned agent: Versed and Fentanyl  ASA Classification: 1  Class 1: A normal healthy patient  Class 2: Pt with mild to moderate systemic disease  Class 3: Severe systemic disease or disturbance  Class 4: Severe systemic disorders that are already life threatening. Class 5: Moribund pt with little chances of survival, for more than 24 hours. Mallampati I Airway Classification: 1    1. Pre-procedure diagnostic studies complete and results available. 2. Previous sedation/anesthesia experiences assessed. 3. The patient is an appropriate candidate to undergo the planned procedure sedation and anesthesia. (Refer to nursing sedation/analgesia documentation record)  4. Formulation and discussion of sedation/procedure plan, risks, and expectations with patient and/or responsible adult completed. 5. Patient examined immediately prior to the procedure.  (Refer to nursing sedation/analgesia documentation record)    Katheleen Hamman, DO  Electronically signed 12/20/2022 at 2:37 PM

## 2022-12-27 ENCOUNTER — OFFICE VISIT (OUTPATIENT)
Dept: PHYSICAL MEDICINE AND REHAB | Age: 37
End: 2022-12-27
Payer: OTHER GOVERNMENT

## 2022-12-27 VITALS
HEIGHT: 71 IN | DIASTOLIC BLOOD PRESSURE: 86 MMHG | WEIGHT: 303 LBS | BODY MASS INDEX: 42.42 KG/M2 | SYSTOLIC BLOOD PRESSURE: 128 MMHG

## 2022-12-27 DIAGNOSIS — G89.29 OTHER CHRONIC PAIN: ICD-10-CM

## 2022-12-27 DIAGNOSIS — M79.18 MYOFASCIAL PAIN: ICD-10-CM

## 2022-12-27 DIAGNOSIS — M47.816 LUMBAR SPONDYLOSIS: Primary | ICD-10-CM

## 2022-12-27 DIAGNOSIS — M51.36 DEGENERATIVE DISC DISEASE, LUMBAR: ICD-10-CM

## 2022-12-27 PROCEDURE — 99213 OFFICE O/P EST LOW 20 MIN: CPT | Performed by: NURSE PRACTITIONER

## 2022-12-27 NOTE — PROGRESS NOTES
Chronic Pain/PM&R Clinic Note     Encounter Date: 12/27/22    Subjective:   Chief Complaint:   Chief Complaint   Patient presents with    Follow Up After Procedure     medial branch blocks bilateral lumbar 4/5, 5/Sacral 1 12/20/22       History of Present Illness:   Manny Monterroso is a 44 y.o. male seen in the clinic initially on 12/27/22 upon request from LUIS ANTONIO Senior for his history of chronic low back pain. He has been dealing with low back pain since 2004 since he was in the [facility]. He states that he sustained an injury while in the [employer]. His pain has progressively gotten worse over the last 20 years. He states his pain is in the small of his low back at his waistline. He rates this as a constant 6/10 aching, stabbing pain. His pain is made worse with any activities where he is on his feet especially with any twisting turning or bending. His pain is improved with rest and medications. 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 He denies any pain radiating further down his leg, associated leg numbness/tingling, focal leg weakness, saddle anesthesia, bowel/bladder incontinence episodes. He has tried physical therapy in the past, medical acupuncture and feels like these have not been very effective for him in the past.    Today, 12/27/2022, patient presents for planned follow up on chronic low back pain. Patient had the confirmatory lumbar medial branch block at bilateral L4/5 and L5/S1 and reports no relief. He actually noticed increased pain after the procedure which lasted 2-3 days. He is now back to his baseline and would like to get an opinion from a surgeon. He denies any new symptoms at this point.     History of Interventions:   Surgery: No previous lumbar surgeries  Injections: TPI - ineffective  Lumbar MBB L4/5 and L5/S1 (11/15/2022) - minimal relief  Confirmatory lumbar MBB L4/5 and L5/S1 (12/20/2022) - no relief, increased pain    Current Treatment Medications:   Meloxicam 15 mg daily    Historical Treatment Medications:   None    Imaging/Studies:  CT L-spine (7/8/22)    PROCEDURE: CT LUMBAR SPINE WO CONTRAST       CLINICAL INFORMATION: Lumbar radiculopathy. Back pain. COMPARISON: No prior study. TECHNIQUE: 3 mm noncontrast axial images were obtained of the lumbar spine. Sagittal and coronal reconstructions were created. Angled images were reconstructed through the L3-4, L4-5 and L5-S1 disc levels. All CT scans at this facility use dose modulation, iterative reconstruction, and/or weight-based dosing when appropriate to reduce radiation dose to as low as reasonably achievable. FINDINGS:   There is minimal, grade 1, retrolisthesis of L2 relative to L3, L3 relative to L4 and L4 relative to L5 on the basis of degenerative change. There is otherwise anatomic vertebral body height and alignment. No fracture of the lumbar vertebral column is    evident. Paraspinal soft tissues are unremarkable. At T12-L1 there is no significant spinal canal or neuroforaminal stenosis. At L1-2 there is no significant spinal canal or neuroforaminal stenosis. At L2-3 there is a minimal disc bulge without significant spinal canal stenosis and mild bilateral neural foraminal stenosis in association with mild ligament flavum thickening. At L3-4 there is partial uncovering the disc with superimposed shallow disc bulge which causes mild spinal canal stenosis in association with ligamentum flavum thickening. There is mild to moderate bilateral neural foraminal stenosis. At L4-5 there is minimal partial tearing the disc with superimposed shallow disc bulge which causes mild spinal canal stenosis in association with ligamentum flavum thickening. There is mild to moderate bilateral foraminal stenosis. At L5-S1 there is no significant spinal canal or neuroforaminal stenosis.            Impression   Overall mild degenerative changes of the lumbar spine with areas of mild spinal canal stenosis and up to mild to moderate neural foraminal stenosis.        Past Medical History:   Diagnosis Date    Anxiety     Hypertension     Low back pain     Sleep apnea        Past Surgical History:   Procedure Laterality Date    APPENDECTOMY      PAIN MANAGEMENT PROCEDURE Bilateral 11/15/2022    medial branch blocks  bilateral Lumbar 4/5, 5/Sacral 1 [facility] Bilateral 12/20/2022    medial branch blocks bilateral lumbar 4/5, 5/Sacral 1 performed by Tonio Iglesias DO at [address] [place] [address]      VASECTOMY      WISDOM TOOTH EXTRACTION         Family History   Problem Relation Age of Onset    No Known Problems Mother          Medications & Allergies:   Current Outpatient Medications   Medication Instructions    amLODIPine (NORVASC) 5 mg, Oral, DAILY    diclofenac sodium (VOLTAREN) 1 % GEL Topical, 3 TIMES DAILY    hydroCHLOROthiazide (HYDRODIURIL) 25 mg, Oral, DAILY    melatonin 10 mg, Oral, NIGHTLY    meloxicam (MOBIC) 15 mg, Oral, DAILY    naproxen (NAPROSYN) 500 mg, Oral, 2 TIMES DAILY    sertraline (ZOLOFT) 50 mg, Oral, DAILY       Allergies   Allergen Reactions    Morphine     Wound Dressings Itching, Rash and Swelling       Review of Systems:   Constitutional: negative for weight changes or fevers  Genitourinary: negative for bowel/bladder incontinence   Musculoskeletal: positive for low back pain  Neurological: negative for any leg weakness or numbness/tingling  Behavioral/Psych: negative for anxiety/depression   All other systems reviewed and are negative    Objective:     Vitals:    12/27/22 0752   BP: 128/86     Constitutional: Pleasant, no acute distress   Head: Normocephalic, atraumatic   Eyes: Conjunctivae normal   Neck: Supple, symmetrical   Lungs: Normal respiratory effort, non-labored breathing   Cardiovascular: Limbs warm and well perfused   Abdomen: Non-protruded   Musculoskeletal: Muscle bulk symmetric, no atrophy, no gross deformities   · Lumbar Spine: ROM reduced in extension. Lumbar paraspinals tender to palpation bilaterally. SLR neg bilaterally. MARTY neg bilaterally. Positive facet loading bilaterally. Bilateral SI joints non-tender to palpation. Neurological: Cranial nerves II-XII grossly intact. · Gait - Slightly antalgic gait. Ambulates without assistive device. · Motor: 5/5 muscle strength in bilateral hip flexion, knee flexion, knee extension, ankle dorsiflexion, and ankle plantar flexion   · Sensory: LT sensation intact in lower limbs   · Reflexes: 2+ symmetrical in bilateral achilles, 2+ bilateral patellar, negative ankle clonus, downgoing babinski   Skin: No rashes or lesions present   Psychological: Cooperative, no exaggerated pain behaviors       Assessment:    Diagnosis Orders   1. Lumbar spondylosis        2. Degenerative disc disease, lumbar        3. Other chronic pain        4. Myofascial pain            Randy Quintanilla is a 44 y.o.male presenting to the pain clinic for evaluation of chronic low back pain. His clinical history and physical examination are consistent with lumbar facet mediated pain secondary to lumbar spondylosis and degenerative disc disease. He has now had two lumbar facet medial branch blocks which provided minimal to no relief, actually aggravating his pain. I have referred him to [address] in regards to discussing potential surgical intervention. He will follow up on a PRN basis. Plan: The following treatment recommendations and plan were discussed in detail with Randy Quintanilla. Imaging/Studies/Labs:   I have reviewed patients imaging of CT L-spine and results were discussed with patient today. Analgesics:   Patient is taking meloxicam. Patient is advised to take as prescribed and not take on an empty stomach.      Adjuvants:   None; patient wants to proceed with injection therapy only. Interventions:   None     Multidisciplinary Pain Management:   In the presence of complex, chronic, and multi-factorial pain, the importance of a multidisciplinary approach to pain management in the patients management regimen was emphasized and discussed in great detail. PHYSICAL THERAPY: Patient is advised to see a physical therapist for gentle stretching exercises and conditioning exercises for management of pain.      Referrals:  [facility]    Prescriptions Written This Visit:   None    Follow-up: LUIS ANTONIO Rosas CNP  Interventional Pain Management/PM&R   [place]

## 2024-06-30 ENCOUNTER — APPOINTMENT (OUTPATIENT)
Dept: GENERAL RADIOLOGY | Age: 39
End: 2024-06-30
Payer: OTHER GOVERNMENT

## 2024-06-30 ENCOUNTER — HOSPITAL ENCOUNTER (EMERGENCY)
Age: 39
Discharge: HOME | End: 2024-06-30
Attending: EMERGENCY MEDICINE
Payer: OTHER GOVERNMENT

## 2024-06-30 VITALS
OXYGEN SATURATION: 97 % | BODY MASS INDEX: 41.58 KG/M2 | TEMPERATURE: 98.1 F | RESPIRATION RATE: 18 BRPM | HEIGHT: 71 IN | DIASTOLIC BLOOD PRESSURE: 86 MMHG | SYSTOLIC BLOOD PRESSURE: 164 MMHG | WEIGHT: 297 LBS | HEART RATE: 87 BPM

## 2024-06-30 DIAGNOSIS — H81.10 BENIGN PAROXYSMAL POSITIONAL VERTIGO, UNSPECIFIED LATERALITY: Primary | ICD-10-CM

## 2024-06-30 LAB
ALBUMIN SERPL BCG-MCNC: 4.3 G/DL (ref 3.5–5.1)
ALP SERPL-CCNC: 73 U/L (ref 38–126)
ALT SERPL W/O P-5'-P-CCNC: 34 U/L (ref 11–66)
ANION GAP SERPL CALC-SCNC: 9 MEQ/L (ref 8–16)
AST SERPL-CCNC: 18 U/L (ref 5–40)
BASOPHILS ABSOLUTE: 0 THOU/MM3 (ref 0–0.1)
BASOPHILS NFR BLD AUTO: 0.3 %
BILIRUB SERPL-MCNC: 0.3 MG/DL (ref 0.3–1.2)
BUN SERPL-MCNC: 8 MG/DL (ref 7–22)
CALCIUM SERPL-MCNC: 8.9 MG/DL (ref 8.5–10.5)
CHLORIDE SERPL-SCNC: 104 MEQ/L (ref 98–111)
CO2 SERPL-SCNC: 25 MEQ/L (ref 23–33)
CREAT SERPL-MCNC: 0.9 MG/DL (ref 0.4–1.2)
DEPRECATED RDW RBC AUTO: 42.6 FL (ref 35–45)
EKG ATRIAL RATE: 76 BPM
EKG P AXIS: 31 DEGREES
EKG P-R INTERVAL: 182 MS
EKG Q-T INTERVAL: 368 MS
EKG QRS DURATION: 100 MS
EKG QTC CALCULATION (BAZETT): 414 MS
EKG R AXIS: 0 DEGREES
EKG T AXIS: 20 DEGREES
EKG VENTRICULAR RATE: 76 BPM
EOSINOPHIL NFR BLD AUTO: 1.4 %
EOSINOPHILS ABSOLUTE: 0.1 THOU/MM3 (ref 0–0.4)
ERYTHROCYTE [DISTWIDTH] IN BLOOD BY AUTOMATED COUNT: 14.7 % (ref 11.5–14.5)
GFR SERPL CREATININE-BSD FRML MDRD: > 90 ML/MIN/1.73M2
GLUCOSE SERPL-MCNC: 110 MG/DL (ref 70–108)
HCT VFR BLD AUTO: 44.2 % (ref 42–52)
HGB BLD-MCNC: 14 GM/DL (ref 14–18)
IMM GRANULOCYTES # BLD AUTO: 0.02 THOU/MM3 (ref 0–0.07)
IMM GRANULOCYTES NFR BLD AUTO: 0.3 %
LYMPHOCYTES ABSOLUTE: 1.1 THOU/MM3 (ref 1–4.8)
LYMPHOCYTES NFR BLD AUTO: 16.5 %
MCH RBC QN AUTO: 25.5 PG (ref 26–33)
MCHC RBC AUTO-ENTMCNC: 31.7 GM/DL (ref 32.2–35.5)
MCV RBC AUTO: 80.5 FL (ref 80–94)
MONOCYTES ABSOLUTE: 0.5 THOU/MM3 (ref 0.4–1.3)
MONOCYTES NFR BLD AUTO: 7.9 %
NEUTROPHILS ABSOLUTE: 4.8 THOU/MM3 (ref 1.8–7.7)
NEUTROPHILS NFR BLD AUTO: 73.6 %
NRBC BLD AUTO-RTO: 0 /100 WBC
OSMOLALITY SERPL CALC.SUM OF ELEC: 274.6 MOSMOL/KG (ref 275–300)
PLATELET # BLD AUTO: 206 THOU/MM3 (ref 130–400)
PMV BLD AUTO: 9.2 FL (ref 9.4–12.4)
POTASSIUM SERPL-SCNC: 4.3 MEQ/L (ref 3.5–5.2)
PROT SERPL-MCNC: 7 G/DL (ref 6.1–8)
RBC # BLD AUTO: 5.49 MILL/MM3 (ref 4.7–6.1)
SODIUM SERPL-SCNC: 138 MEQ/L (ref 135–145)
TROPONIN, HIGH SENSITIVITY: < 6 NG/L (ref 0–12)
WBC # BLD AUTO: 6.5 THOU/MM3 (ref 4.8–10.8)

## 2024-06-30 PROCEDURE — 6370000000 HC RX 637 (ALT 250 FOR IP): Performed by: EMERGENCY MEDICINE

## 2024-06-30 PROCEDURE — 84484 ASSAY OF TROPONIN QUANT: CPT

## 2024-06-30 PROCEDURE — 93005 ELECTROCARDIOGRAM TRACING: CPT | Performed by: EMERGENCY MEDICINE

## 2024-06-30 PROCEDURE — 80053 COMPREHEN METABOLIC PANEL: CPT

## 2024-06-30 PROCEDURE — 36415 COLL VENOUS BLD VENIPUNCTURE: CPT

## 2024-06-30 PROCEDURE — 71046 X-RAY EXAM CHEST 2 VIEWS: CPT

## 2024-06-30 PROCEDURE — 85025 COMPLETE CBC W/AUTO DIFF WBC: CPT

## 2024-06-30 PROCEDURE — 99285 EMERGENCY DEPT VISIT HI MDM: CPT

## 2024-06-30 RX ORDER — MECLIZINE HCL 25MG 25 MG/1
25 TABLET, CHEWABLE ORAL ONCE
Status: COMPLETED | OUTPATIENT
Start: 2024-06-30 | End: 2024-06-30

## 2024-06-30 RX ORDER — MECLIZINE HYDROCHLORIDE 25 MG/1
25 TABLET ORAL 3 TIMES DAILY PRN
Qty: 15 TABLET | Refills: 0 | Status: SHIPPED | OUTPATIENT
Start: 2024-06-30 | End: 2024-07-10

## 2024-06-30 RX ADMIN — MECLIZINE HYDROCHLORIDE 25 MG: 25 TABLET, CHEWABLE ORAL at 14:46

## 2024-06-30 ASSESSMENT — PAIN - FUNCTIONAL ASSESSMENT: PAIN_FUNCTIONAL_ASSESSMENT: 0-10

## 2024-06-30 ASSESSMENT — PAIN SCALES - GENERAL: PAINLEVEL_OUTOF10: 8

## 2024-06-30 ASSESSMENT — PAIN DESCRIPTION - LOCATION: LOCATION: HEAD

## 2024-06-30 NOTE — DISCHARGE INSTRUCTIONS
Take the medication as indicated.  Get up slowly; dangle your feet over the bed before standing up, do not stand up quickly.     Sit upright.  Turn your head to the symptomatic side at a 45 degree angle, and lie on your back  Remain up to 5 minutes in this position.  Turn your head 90 degrees to the other side  Remain up to 5 minutes in this position.  Roll your body onto your side in the direction you are facing; now you are pointing your head nose down.  Remain up to 5 minutes in this position.  Go back to the sitting position and remain up to 30 seconds in this position.    The entire procedure should be repeated two more times, for a total of three times.    Return to the Emergency Department for any other care or concern.    PLEASE RETURN TO THE EMERGENCY DEPARTMENT IMMEDIATELY for worsening symptoms, worsening of sensation of room spinning, any headache, slurring of speech, change in vision / hearing / taste, ringing in your ears, loss of sensation or difficulty moving your arms or legs, excessive nausea or vomiting, or if you develop any concerning symptoms such as: high fever not relieved by acetaminophen (Tylenol) and/or ibuprofen (Motrin / Advil), chills, shortness of breath, chest pain, feeling of your heart fluttering or racing, persistent nausea and/or vomiting, vomiting up blood, blood in your stool, loss of consciousness, numbness, weakness or tingling in the arms or legs or change in color of the extremities, changes in mental status, persistent headache, blurry vision, loss of bladder / bowel control, unable to follow up with your physician, or other any other care or concern.

## 2024-06-30 NOTE — ED PROVIDER NOTES
PATIENT NAME: Marlon Fuller  MRN: [id number]  : 1985  HUERTAS: 2024    I performed a history and physical examination of the patient and discussed management with the Resident. I reviewed the Resident's note and agree with the documented findings and plan of care. Any areas of disagreement are noted on the chart. I was personally present for the key portions of any procedures and have documented in the chart those procedures where I was not present during the key portions. I have reviewed the emergency nurses triage note and agree with the chief complaint, past medical history, past surgical history, allergies, medications, social and family history as documented unless otherwise noted below.    MEDICAL DEDISION MAKING (MDM)     Marlon Fuller is a 38 y.o. male who presents to Emergency Department with Dizziness and Nausea & Vomiting     Sudden onset of vertigo type of dizziness since this morning.  No lightheadedness. History of bilateral tinnitus and HTN.    Physical exam: AVSS.  Heart and lungs unremarkable.  Soft abdomen without tenderness.  Mild right side horizontal nystagmus with negative HINTS exam, otherwise neurologically intact.    EKG interpreted by me as NSR, VR 76 bpm, UT interval 182 ms, ,  ms, incomplete right bundle branch block, no acute changes, borderline normal ECG.    ED workup is reassuring.    Patient's vertigo improved with ED treatment with meclizine.  Patient's history, exam, and ED workups suggest patient has peripheral vertigo, no suspicion patient has a central vertigo.  No indication for ED brain imaging.  If his symptoms do not show significant improvement in about a week, he may come back to ED or follow-up with PCP to consider brain imaging study, ideally a brain MRI with contrast.     He is prescribed meclizine and discharged with PCP follow-up in 1 week.    Vitals:    24 1354 24 1451   BP: (!) 160/93 (!) 164/86   Pulse: 80 87   Resp: 20 18 
DO    This transcription was electronically signed. Parts of this transcriptions may have been dictated by use of voice recognition software and electronically transcribed, and parts may have been transcribed with the assistance of an ED scribe. The transcription may contain errors not detected in proofreading.  Please refer to my supervising physician's documentation if my documentation differs.    Electronically Signed: Shubham Byrd DO, 06/30/24, 3:41 PM

## 2024-06-30 NOTE — ED TRIAGE NOTES
Pt c/o dizziness, headache, nausea and emesis onset today. EKG complete. Hx HTN. Pt refusing covid/flu test.

## 2024-07-10 ENCOUNTER — HOSPITAL ENCOUNTER (EMERGENCY)
Age: 39
Discharge: HOME | End: 2024-07-10
Payer: OTHER GOVERNMENT

## 2024-07-10 ENCOUNTER — APPOINTMENT (OUTPATIENT)
Dept: MRI IMAGING | Age: 39
End: 2024-07-10
Payer: OTHER GOVERNMENT

## 2024-07-10 VITALS
WEIGHT: 297 LBS | HEART RATE: 81 BPM | BODY MASS INDEX: 41.58 KG/M2 | DIASTOLIC BLOOD PRESSURE: 96 MMHG | SYSTOLIC BLOOD PRESSURE: 155 MMHG | OXYGEN SATURATION: 97 % | RESPIRATION RATE: 18 BRPM | TEMPERATURE: 98.3 F | HEIGHT: 71 IN

## 2024-07-10 DIAGNOSIS — R51.9 NONINTRACTABLE HEADACHE, UNSPECIFIED CHRONICITY PATTERN, UNSPECIFIED HEADACHE TYPE: Primary | ICD-10-CM

## 2024-07-10 PROCEDURE — 99285 EMERGENCY DEPT VISIT HI MDM: CPT

## 2024-07-10 PROCEDURE — A9579 GAD-BASE MR CONTRAST NOS,1ML: HCPCS

## 2024-07-10 PROCEDURE — 6360000004 HC RX CONTRAST MEDICATION

## 2024-07-10 PROCEDURE — 70553 MRI BRAIN STEM W/O & W/DYE: CPT

## 2024-07-10 RX ADMIN — GADOTERIDOL 20 ML: 279.3 INJECTION, SOLUTION INTRAVENOUS at 18:19

## 2024-07-10 ASSESSMENT — PAIN DESCRIPTION - LOCATION: LOCATION: HEAD

## 2024-07-10 ASSESSMENT — PAIN SCALES - GENERAL: PAINLEVEL_OUTOF10: 8

## 2024-07-10 ASSESSMENT — PAIN - FUNCTIONAL ASSESSMENT: PAIN_FUNCTIONAL_ASSESSMENT: 0-10

## 2024-07-10 NOTE — ED PROVIDER NOTES
[facility] EMERGENCY DEPT      EMERGENCY MEDICINE     Pt Name: Marlon Fuller  MRN: [id number]  Birthdate 1985  Date of evaluation: 7/10/2024  Provider: Flavia Barba PA-C    CHIEF COMPLAINT       Chief Complaint   Patient presents with    Migraine     HISTORY OF PRESENT ILLNESS   Marlon Fuller is a pleasant 38 y.o. male who presents to the emergency department from from home, by private vehicle for evaluation of headache.  [facility].  Patient has had a headache for 3 weeks, for which he was seen here on June 30.  His headache is bilateral over the frontalis. When he was seen in June patient was also endorsing dizziness, which he said was resolved by Meclizine.  Patient denies headache being sudden onset in nature, trauma to the head, fever, neck rigidity, vision changes, hearing changes, or being positional in nature.    PASTMEDICAL HISTORY     Past Medical History:   Diagnosis Date    Anxiety     Hypertension     Low back pain     Sleep apnea        There is no problem list on file for this patient.    SURGICAL HISTORY       Past Surgical History:   Procedure Laterality Date    APPENDECTOMY      PAIN MANAGEMENT PROCEDURE Bilateral 11/15/2022    medial branch blocks  bilateral Lumbar 4/5, 5/Sacral 1 performed by Krzysztof Holbrook DO at [facility] OR    PAIN MANAGEMENT PROCEDURE Bilateral 12/20/2022    medial branch blocks bilateral lumbar 4/5, 5/Sacral 1 performed by Krzysztof Holbrook DO at [facility] OR    ROTATOR CUFF REPAIR      VASECTOMY      WISDOM TOOTH EXTRACTION         CURRENT MEDICATIONS       Discharge Medication List as of 7/10/2024  6:39 PM        CONTINUE these medications which have NOT CHANGED    Details   meclizine (ANTIVERT) 25 MG tablet Take 1 tablet by mouth 3 times daily as needed for Dizziness, Disp-15 tablet, R-0Normal      hydroCHLOROthiazide (HYDRODIURIL) 25 MG tablet Take 25 mg by mouth dailyHistorical Med

## 2024-07-10 NOTE — ED TRIAGE NOTES
[facility]. Pt states that migraine has been going on from 3 weeks. [facility]. Pt states last week he experienced dizziness and nausea but that has since subsided. Pt denies any vision changes. Pt alert and oriented on arrival.

## 2024-07-10 NOTE — DISCHARGE INSTRUCTIONS
[facility] with [facility].    Smoking cigarettes or being around anyone that smokes cigarettes can cause constriction of the blood vessels in the brain which in turn can cause you to have further headaches.    For pain use acetaminophen (Tylenol) or ibuprofen (Motrin / Advil), unless prescribed medications that have acetaminophen or ibuprofen (or similar medications) in it.  You can take over the counter acetaminophen tablets (1 - 2 tablets of the 500-mg strength every 6 hours) or ibuprofen tablets (2 tablets every 4 hours).   Avoid taking narcotics for headaches (can cause a worse headache in several hours after taking the medication).  Make sure that you drink plenty of water or Gatorade (or similar solution) to keep yourself hydrated.    PLEASE RETURN TO THE EMERGENCY DEPARTMENT IMMEDIATELY for worsening symptoms, change in vision / hearing / taste, ringing in your ears, loss of sensation or difficulty moving your arms or legs, or if you develop any concerning symptoms such as: high fever not relieved by acetaminophen (Tylenol) and/or ibuprofen (Motrin / Advil), chills, shortness of breath, chest pain, feeling of your heart fluttering or racing, persistent nausea and/or vomiting, vomiting up blood, blood in your stool, numbness, loss of consciousness, weakness or tingling in the arms or legs or change in color of the extremities, changes in mental status, persistent headache, blurry vision, loss of bladder / bowel control, unable to follow up with your physician, or other any other care or concern

## 2024-07-10 NOTE — ED NOTES
Pt and VS reassessed at this time. Pt resting on cot with call light in reach. Respirations even and unlabored. Pt denies any current needs.

## (undated) DEVICE — 3 ML SYRINGE LUER-LOCK TIP: Brand: MONOJECT

## (undated) DEVICE — SYRINGE MED 10ML LUERLOCK TIP W/O SFTY DISP

## (undated) DEVICE — NEEDLE SPNL 22GA L3.5IN BLK HUB S STL REG WALL FIT STYL W/

## (undated) DEVICE — GAUZE SPONGES,USP TYPE VII GAUZE, 12 PLY: Brand: CURITY

## (undated) DEVICE — MARKER,SKIN,WI/RULER AND LABELS: Brand: MEDLINE

## (undated) DEVICE — HYPODERMIC SAFETY NEEDLE: Brand: MAGELLAN

## (undated) DEVICE — APPLICATOR MEDICATED 3 CC SOLUTION CLR STRL CHLORAPREP

## (undated) DEVICE — TOWEL,OR,DSP,ST,BLUE,STD,4/PK,20PK/CS: Brand: MEDLINE

## (undated) DEVICE — GLOVE BIOGEL POWDER FREE SZ 8

## (undated) DEVICE — 1840 FOAM BLOCK NEEDLE COUNTER: Brand: DEVON

## (undated) DEVICE — NEEDLE HYPO 18GA L1.5IN THN WALL PIVOTING SHLD BVL ORIENTED